# Patient Record
Sex: FEMALE | Race: WHITE | NOT HISPANIC OR LATINO
[De-identification: names, ages, dates, MRNs, and addresses within clinical notes are randomized per-mention and may not be internally consistent; named-entity substitution may affect disease eponyms.]

---

## 2017-01-06 ENCOUNTER — APPOINTMENT (OUTPATIENT)
Dept: PULMONOLOGY | Facility: CLINIC | Age: 72
End: 2017-01-06

## 2017-01-06 VITALS — SYSTOLIC BLOOD PRESSURE: 122 MMHG | DIASTOLIC BLOOD PRESSURE: 80 MMHG

## 2017-01-06 VITALS — WEIGHT: 158 LBS | BODY MASS INDEX: 24.02 KG/M2

## 2017-03-27 ENCOUNTER — OUTPATIENT (OUTPATIENT)
Dept: OUTPATIENT SERVICES | Facility: HOSPITAL | Age: 72
LOS: 1 days | End: 2017-03-27
Payer: MEDICARE

## 2017-03-27 VITALS
HEIGHT: 68.5 IN | SYSTOLIC BLOOD PRESSURE: 174 MMHG | TEMPERATURE: 98 F | RESPIRATION RATE: 18 BRPM | OXYGEN SATURATION: 97 % | DIASTOLIC BLOOD PRESSURE: 72 MMHG

## 2017-03-27 VITALS
HEIGHT: 68.5 IN | SYSTOLIC BLOOD PRESSURE: 174 MMHG | HEART RATE: 59 BPM | TEMPERATURE: 98 F | RESPIRATION RATE: 18 BRPM | WEIGHT: 156.09 LBS | OXYGEN SATURATION: 97 % | DIASTOLIC BLOOD PRESSURE: 72 MMHG

## 2017-03-27 DIAGNOSIS — Z90.49 ACQUIRED ABSENCE OF OTHER SPECIFIED PARTS OF DIGESTIVE TRACT: Chronic | ICD-10-CM

## 2017-03-27 DIAGNOSIS — E89.0 POSTPROCEDURAL HYPOTHYROIDISM: Chronic | ICD-10-CM

## 2017-03-27 DIAGNOSIS — Z01.810 ENCOUNTER FOR PREPROCEDURAL CARDIOVASCULAR EXAMINATION: ICD-10-CM

## 2017-03-27 DIAGNOSIS — I77.0 ARTERIOVENOUS FISTULA, ACQUIRED: Chronic | ICD-10-CM

## 2017-03-27 LAB
ANION GAP SERPL CALC-SCNC: 16 MMOL/L — SIGNIFICANT CHANGE UP (ref 5–17)
APTT BLD: 31.9 SEC — SIGNIFICANT CHANGE UP (ref 27.5–37.4)
BUN SERPL-MCNC: 59 MG/DL — HIGH (ref 8–20)
CALCIUM SERPL-MCNC: 7.9 MG/DL — LOW (ref 8.6–10.2)
CHLORIDE SERPL-SCNC: 97 MMOL/L — LOW (ref 98–107)
CO2 SERPL-SCNC: 25 MMOL/L — SIGNIFICANT CHANGE UP (ref 22–29)
CREAT SERPL-MCNC: 7.41 MG/DL — HIGH (ref 0.5–1.3)
GLUCOSE SERPL-MCNC: 93 MG/DL — SIGNIFICANT CHANGE UP (ref 70–115)
HCT VFR BLD CALC: 43.1 % — SIGNIFICANT CHANGE UP (ref 37–47)
HGB BLD-MCNC: 13.2 G/DL — SIGNIFICANT CHANGE UP (ref 12–16)
INR BLD: 1.04 RATIO — SIGNIFICANT CHANGE UP (ref 0.88–1.16)
MCHC RBC-ENTMCNC: 28.9 PG — SIGNIFICANT CHANGE UP (ref 27–31)
MCHC RBC-ENTMCNC: 30.6 G/DL — LOW (ref 32–36)
MCV RBC AUTO: 94.5 FL — SIGNIFICANT CHANGE UP (ref 81–99)
PLATELET # BLD AUTO: 271 K/UL — SIGNIFICANT CHANGE UP (ref 150–400)
POTASSIUM SERPL-MCNC: 5 MMOL/L — SIGNIFICANT CHANGE UP (ref 3.5–5.3)
POTASSIUM SERPL-SCNC: 5 MMOL/L — SIGNIFICANT CHANGE UP (ref 3.5–5.3)
PROTHROM AB SERPL-ACNC: 11.5 SEC — SIGNIFICANT CHANGE UP (ref 9.8–12.7)
RBC # BLD: 4.56 M/UL — SIGNIFICANT CHANGE UP (ref 4.4–5.2)
RBC # FLD: 17.2 % — HIGH (ref 11–15.6)
SODIUM SERPL-SCNC: 138 MMOL/L — SIGNIFICANT CHANGE UP (ref 135–145)
WBC # BLD: 4.9 K/UL — SIGNIFICANT CHANGE UP (ref 4.8–10.8)
WBC # FLD AUTO: 4.9 K/UL — SIGNIFICANT CHANGE UP (ref 4.8–10.8)

## 2017-03-27 PROCEDURE — 93010 ELECTROCARDIOGRAM REPORT: CPT

## 2017-03-27 RX ORDER — DIPHENHYDRAMINE HCL 50 MG
25 CAPSULE ORAL ONCE
Qty: 0 | Refills: 0 | Status: DISCONTINUED | OUTPATIENT
Start: 2017-03-30 | End: 2017-04-11

## 2017-03-27 NOTE — ASU PATIENT PROFILE, ADULT - PMH
Carotid artery disease  with bilat stenting  Coronary artery disease  s/p PTCA  CRF (chronic renal failure)    Diabetes    ESRD (end stage renal disease) on dialysis  dr cee   has av fistula  Hypertension    Thyroid cancer

## 2017-03-27 NOTE — H&P PST ADULT - HISTORY OF PRESENT ILLNESS
70 yo female with h/o CAD with stent   Recent c/o SOB at rest 72 yo female with h/o CAD with stent to RCA and LAD 7 years ago, carotid stents, HTN, HLD .  Patient was recently at [facility] 1/13/2017-1/22/2017 for GI bleed.  Patient had endoscopy and received 2 units of packed res blood cells. Recent c/o SOB at rest and chronic cough for several months. PET scan shows suspicious lung lesions bilaterally the patient is being scheduled for VATS procedure.  Patient had recent carotid study and stress test. Carotid study showed plaquing bilaterally worsening in LICA. 72 yo female with h/o CAD with stent to RCA and LAD 7 years ago, bilateral carotid stents, HTN, HLD, ESRD on dialysis.  Patient was recently at [facility] 1/13/2017-1/22/2017 for GI bleed.  Patient had endoscopy and received 2 units of packed res blood cells. Recent c/o SOB at rest and chronic cough for several months. PET scan shows suspicious lung lesions bilaterally the patient is being scheduled for VATS procedure.  Patient had recent carotid study and stress test. Carotid study showed plaquing bilaterally worsening in LICA (60-70%).  Stress test mild, mid-apical inferior wall ischemia.  EF 47%.  Patient developed SOB during procedure, symptoms resolved with aminophyline and procedure was stopped.   Patient presents for PST for cardiac cath to r/o CAD.      Dialysis [place] Tuesday, Thursday and Saturday with Dr Bass.

## 2017-03-27 NOTE — H&P PST ADULT - FAMILY HISTORY
Mother  Still living? No  Family history of diabetes mellitus, Age at diagnosis: Age Unknown     Father  Still living? No  Family history of emphysema, Age at diagnosis: Age Unknown     Sibling  Still living? No  Family history of emphysema, Age at diagnosis: Age Unknown

## 2017-03-27 NOTE — H&P PST ADULT - PSH
AV fistula  left arm  S/P colon resection    S/P thyroidectomy  had radiation treatments and surgery for thyroid cancer

## 2017-03-27 NOTE — H&P PST ADULT - PMH
Carotid artery disease  with bilat stenting  Coronary artery disease  s/p PTCA  CRF (chronic renal failure)    Diabetes    Hypertension    Thyroid cancer

## 2017-03-28 DIAGNOSIS — Z01.810 ENCOUNTER FOR PREPROCEDURAL CARDIOVASCULAR EXAMINATION: ICD-10-CM

## 2017-03-28 DIAGNOSIS — I25.10 ATHEROSCLEROTIC HEART DISEASE OF NATIVE CORONARY ARTERY WITHOUT ANGINA PECTORIS: ICD-10-CM

## 2017-03-30 ENCOUNTER — INPATIENT (INPATIENT)
Facility: HOSPITAL | Age: 72
LOS: 0 days | Discharge: HOME | DRG: 248 | End: 2017-03-31
Attending: INTERNAL MEDICINE | Admitting: INTERNAL MEDICINE
Payer: COMMERCIAL

## 2017-03-30 VITALS
SYSTOLIC BLOOD PRESSURE: 222 MMHG | DIASTOLIC BLOOD PRESSURE: 102 MMHG | TEMPERATURE: 98 F | OXYGEN SATURATION: 99 % | RESPIRATION RATE: 16 BRPM | HEART RATE: 64 BPM

## 2017-03-30 DIAGNOSIS — R94.39 ABNORMAL RESULT OF OTHER CARDIOVASCULAR FUNCTION STUDY: ICD-10-CM

## 2017-03-30 DIAGNOSIS — Z90.49 ACQUIRED ABSENCE OF OTHER SPECIFIED PARTS OF DIGESTIVE TRACT: Chronic | ICD-10-CM

## 2017-03-30 DIAGNOSIS — I77.0 ARTERIOVENOUS FISTULA, ACQUIRED: Chronic | ICD-10-CM

## 2017-03-30 DIAGNOSIS — E89.0 POSTPROCEDURAL HYPOTHYROIDISM: Chronic | ICD-10-CM

## 2017-03-30 PROCEDURE — 93458 L HRT ARTERY/VENTRICLE ANGIO: CPT | Mod: 26,XU

## 2017-03-30 PROCEDURE — 93010 ELECTROCARDIOGRAM REPORT: CPT

## 2017-03-30 PROCEDURE — 92928 PRQ TCAT PLMT NTRAC ST 1 LES: CPT | Mod: RC

## 2017-03-30 RX ORDER — LEVOTHYROXINE SODIUM 125 MCG
200 TABLET ORAL DAILY
Qty: 0 | Refills: 0 | Status: DISCONTINUED | OUTPATIENT
Start: 2017-03-30 | End: 2017-03-31

## 2017-03-30 RX ORDER — FEBUXOSTAT 40 MG/1
40 TABLET ORAL DAILY
Qty: 0 | Refills: 0 | Status: DISCONTINUED | OUTPATIENT
Start: 2017-03-30 | End: 2017-03-31

## 2017-03-30 RX ORDER — HYDRALAZINE HCL 50 MG
10 TABLET ORAL ONCE
Qty: 0 | Refills: 0 | Status: COMPLETED | OUTPATIENT
Start: 2017-03-30 | End: 2017-03-30

## 2017-03-30 RX ORDER — ALBUTEROL 90 UG/1
2 AEROSOL, METERED ORAL EVERY 6 HOURS
Qty: 0 | Refills: 0 | Status: DISCONTINUED | OUTPATIENT
Start: 2017-03-30 | End: 2017-03-31

## 2017-03-30 RX ORDER — SODIUM CHLORIDE 9 MG/ML
1000 INJECTION, SOLUTION INTRAVENOUS
Qty: 0 | Refills: 0 | Status: DISCONTINUED | OUTPATIENT
Start: 2017-03-30 | End: 2017-03-31

## 2017-03-30 RX ORDER — DEXTROSE 50 % IN WATER 50 %
25 SYRINGE (ML) INTRAVENOUS ONCE
Qty: 0 | Refills: 0 | Status: DISCONTINUED | OUTPATIENT
Start: 2017-03-30 | End: 2017-03-31

## 2017-03-30 RX ORDER — SEVELAMER CARBONATE 2400 MG/1
800 POWDER, FOR SUSPENSION ORAL
Qty: 0 | Refills: 0 | Status: DISCONTINUED | OUTPATIENT
Start: 2017-03-30 | End: 2017-03-31

## 2017-03-30 RX ORDER — CLOPIDOGREL BISULFATE 75 MG/1
75 TABLET, FILM COATED ORAL DAILY
Qty: 0 | Refills: 0 | Status: DISCONTINUED | OUTPATIENT
Start: 2017-03-30 | End: 2017-03-31

## 2017-03-30 RX ORDER — MESALAMINE 400 MG
1200 TABLET, DELAYED RELEASE (ENTERIC COATED) ORAL THREE TIMES A DAY
Qty: 0 | Refills: 0 | Status: DISCONTINUED | OUTPATIENT
Start: 2017-03-30 | End: 2017-03-31

## 2017-03-30 RX ORDER — ACETAMINOPHEN 500 MG
650 TABLET ORAL EVERY 6 HOURS
Qty: 0 | Refills: 0 | Status: DISCONTINUED | OUTPATIENT
Start: 2017-03-30 | End: 2017-03-31

## 2017-03-30 RX ORDER — GLUCAGON INJECTION, SOLUTION 0.5 MG/.1ML
1 INJECTION, SOLUTION SUBCUTANEOUS ONCE
Qty: 0 | Refills: 0 | Status: DISCONTINUED | OUTPATIENT
Start: 2017-03-30 | End: 2017-03-31

## 2017-03-30 RX ORDER — NEBIVOLOL HYDROCHLORIDE 5 MG/1
20 TABLET ORAL DAILY
Qty: 0 | Refills: 0 | Status: DISCONTINUED | OUTPATIENT
Start: 2017-03-30 | End: 2017-03-31

## 2017-03-30 RX ORDER — FAMOTIDINE 10 MG/ML
20 INJECTION INTRAVENOUS ONCE
Qty: 0 | Refills: 0 | Status: COMPLETED | OUTPATIENT
Start: 2017-03-30 | End: 2017-03-30

## 2017-03-30 RX ORDER — DEXTROSE 50 % IN WATER 50 %
1 SYRINGE (ML) INTRAVENOUS ONCE
Qty: 0 | Refills: 0 | Status: DISCONTINUED | OUTPATIENT
Start: 2017-03-30 | End: 2017-03-31

## 2017-03-30 RX ORDER — HYDRALAZINE HCL 50 MG
10 TABLET ORAL THREE TIMES A DAY
Qty: 0 | Refills: 0 | Status: DISCONTINUED | OUTPATIENT
Start: 2017-03-30 | End: 2017-03-31

## 2017-03-30 RX ORDER — DEXTROSE 50 % IN WATER 50 %
12.5 SYRINGE (ML) INTRAVENOUS ONCE
Qty: 0 | Refills: 0 | Status: DISCONTINUED | OUTPATIENT
Start: 2017-03-30 | End: 2017-03-31

## 2017-03-30 RX ORDER — INSULIN GLARGINE 100 [IU]/ML
5 INJECTION, SOLUTION SUBCUTANEOUS AT BEDTIME
Qty: 0 | Refills: 0 | Status: DISCONTINUED | OUTPATIENT
Start: 2017-03-30 | End: 2017-03-31

## 2017-03-30 RX ORDER — HYDRALAZINE HCL 50 MG
10 TABLET ORAL ONCE
Qty: 0 | Refills: 0 | Status: DISCONTINUED | OUTPATIENT
Start: 2017-03-30 | End: 2017-03-30

## 2017-03-30 RX ORDER — FOLIC ACID 0.8 MG
1 TABLET ORAL DAILY
Qty: 0 | Refills: 0 | Status: DISCONTINUED | OUTPATIENT
Start: 2017-03-30 | End: 2017-03-31

## 2017-03-30 RX ORDER — HYDRALAZINE HCL 50 MG
10 TABLET ORAL DAILY
Qty: 0 | Refills: 0 | Status: DISCONTINUED | OUTPATIENT
Start: 2017-03-30 | End: 2017-03-31

## 2017-03-30 RX ORDER — INSULIN GLARGINE 100 [IU]/ML
5 INJECTION, SOLUTION SUBCUTANEOUS EVERY MORNING
Qty: 0 | Refills: 0 | Status: DISCONTINUED | OUTPATIENT
Start: 2017-03-31 | End: 2017-03-31

## 2017-03-30 RX ORDER — NIFEDIPINE 30 MG
90 TABLET, EXTENDED RELEASE 24 HR ORAL EVERY 12 HOURS
Qty: 0 | Refills: 0 | Status: DISCONTINUED | OUTPATIENT
Start: 2017-03-30 | End: 2017-03-31

## 2017-03-30 RX ORDER — SODIUM CHLORIDE 9 MG/ML
3 INJECTION INTRAMUSCULAR; INTRAVENOUS; SUBCUTANEOUS EVERY 8 HOURS
Qty: 0 | Refills: 0 | Status: DISCONTINUED | OUTPATIENT
Start: 2017-03-30 | End: 2017-03-31

## 2017-03-30 RX ORDER — LOSARTAN POTASSIUM 100 MG/1
100 TABLET, FILM COATED ORAL DAILY
Qty: 0 | Refills: 0 | Status: DISCONTINUED | OUTPATIENT
Start: 2017-03-30 | End: 2017-03-31

## 2017-03-30 RX ORDER — COLCHICINE 0.6 MG
0.6 TABLET ORAL DAILY
Qty: 0 | Refills: 0 | Status: DISCONTINUED | OUTPATIENT
Start: 2017-03-30 | End: 2017-03-31

## 2017-03-30 RX ORDER — INSULIN LISPRO 100/ML
VIAL (ML) SUBCUTANEOUS
Qty: 0 | Refills: 0 | Status: DISCONTINUED | OUTPATIENT
Start: 2017-03-30 | End: 2017-03-31

## 2017-03-30 RX ORDER — PANTOPRAZOLE SODIUM 20 MG/1
40 TABLET, DELAYED RELEASE ORAL
Qty: 0 | Refills: 0 | Status: DISCONTINUED | OUTPATIENT
Start: 2017-03-30 | End: 2017-03-31

## 2017-03-30 RX ORDER — LABETALOL HCL 100 MG
10 TABLET ORAL ONCE
Qty: 0 | Refills: 0 | Status: DISCONTINUED | OUTPATIENT
Start: 2017-03-30 | End: 2017-03-30

## 2017-03-30 RX ORDER — CALCIUM ACETATE 667 MG
667 TABLET ORAL
Qty: 0 | Refills: 0 | Status: DISCONTINUED | OUTPATIENT
Start: 2017-03-30 | End: 2017-03-31

## 2017-03-30 RX ORDER — ASPIRIN/CALCIUM CARB/MAGNESIUM 324 MG
81 TABLET ORAL DAILY
Qty: 0 | Refills: 0 | Status: DISCONTINUED | OUTPATIENT
Start: 2017-03-30 | End: 2017-03-31

## 2017-03-30 RX ORDER — DIPHENHYDRAMINE HCL 50 MG
50 CAPSULE ORAL ONCE
Qty: 0 | Refills: 0 | Status: COMPLETED | OUTPATIENT
Start: 2017-03-30 | End: 2017-03-30

## 2017-03-30 RX ADMIN — FEBUXOSTAT 40 MILLIGRAM(S): 40 TABLET ORAL at 22:57

## 2017-03-30 RX ADMIN — Medication 650 MILLIGRAM(S): at 12:30

## 2017-03-30 RX ADMIN — Medication 10 MILLIGRAM(S): at 23:19

## 2017-03-30 RX ADMIN — Medication 10 MILLIGRAM(S): at 12:14

## 2017-03-30 RX ADMIN — Medication 0.6 MILLIGRAM(S): at 22:57

## 2017-03-30 RX ADMIN — INSULIN GLARGINE 5 UNIT(S): 100 INJECTION, SOLUTION SUBCUTANEOUS at 23:19

## 2017-03-30 RX ADMIN — Medication 10 MILLIGRAM(S): at 08:07

## 2017-03-30 RX ADMIN — SEVELAMER CARBONATE 800 MILLIGRAM(S): 2400 POWDER, FOR SUSPENSION ORAL at 15:20

## 2017-03-30 RX ADMIN — Medication 650 MILLIGRAM(S): at 11:10

## 2017-03-30 RX ADMIN — Medication 1 MILLIGRAM(S): at 15:19

## 2017-03-30 RX ADMIN — SODIUM CHLORIDE 3 MILLILITER(S): 9 INJECTION INTRAMUSCULAR; INTRAVENOUS; SUBCUTANEOUS at 22:12

## 2017-03-30 RX ADMIN — FAMOTIDINE 20 MILLIGRAM(S): 10 INJECTION INTRAVENOUS at 08:21

## 2017-03-30 RX ADMIN — SODIUM CHLORIDE 3 MILLILITER(S): 9 INJECTION INTRAMUSCULAR; INTRAVENOUS; SUBCUTANEOUS at 14:32

## 2017-03-30 RX ADMIN — Medication 10 MILLIGRAM(S): at 15:37

## 2017-03-30 RX ADMIN — Medication 50 MILLIGRAM(S): at 08:05

## 2017-03-30 RX ADMIN — PANTOPRAZOLE SODIUM 40 MILLIGRAM(S): 20 TABLET, DELAYED RELEASE ORAL at 15:20

## 2017-03-30 NOTE — PROGRESS NOTE ADULT - SUBJECTIVE AND OBJECTIVE BOX
Nurse Practitioner Progress note:   s/p [facility] with successful PCI and BMSx2 to RCA      Patient feels well.  Denies chest pain, shortness of breath, dizziness or palpitations at this time    Right radial hemoband in place.  Procedure site CDI, no bleeding, no hematoma, able to move all digits with capillary refill < 3 seconds, fingers warm      T(C): 36.6, Max: 36.6 (03-30 @ 07:56)  HR: 66 (64 - 66)  BP: 166/68 (166/68 - 222/102)  RR: 15 (15 - 16)  SpO2: 98% (98% - 99%)  Wt(kg): --    MEDICATIONS  (STANDING):  sodium chloride 0.9% lock flush 3milliLiter(s) IV Push every 8 hours  aspirin enteric coated 81milliGRAM(s) Oral daily  losartan 100milliGRAM(s) Oral daily  nebivolol 20milliGRAM(s) Oral daily  febuxostat 40milliGRAM(s) Oral daily  colchicine 0.6milliGRAM(s) Oral daily  clopidogrel Tablet 75milliGRAM(s) Oral daily  NIFEdipine XL 90milliGRAM(s) Oral every 12 hours  calcium acetate 667milliGRAM(s) Oral three times a day with meals  sevelamer hydrochloride 800milliGRAM(s) Oral three times a day with meals  pantoprazole    Tablet 40milliGRAM(s) Oral before breakfast  levothyroxine 200MICROGram(s) Oral daily  hydrALAZINE 10milliGRAM(s) Oral three times a day  folic acid 1milliGRAM(s) Oral daily  mesalamine DR Capsule 1200milliGRAM(s) Oral three times a day  insulin glargine Injectable (LANTUS) 5Unit(s) SubCutaneous at bedtime  insulin lispro (HumaLOG) corrective regimen sliding scale  SubCutaneous Before meals and at bedtime  dextrose 5%. 1000milliLiter(s) IV Continuous <Continuous>  dextrose 50% Injectable 12.5Gram(s) IV Push once  dextrose 50% Injectable 25Gram(s) IV Push once  dextrose 50% Injectable 25Gram(s) IV Push once    MEDICATIONS  (PRN):  ALBUTerol    90 MICROgram(s) HFA Inhaler 2Puff(s) Inhalation every 6 hours PRN Shortness of Breath and/or Wheezing  dextrose Gel 1Dose(s) Oral once PRN Blood Glucose LESS THAN 70 milliGRAM(s)/deciliter  glucagon  Injectable 1milliGRAM(s) IntraMuscular once PRN Glucose LESS THAN 70 milligrams/deciliter        71 year old female with h/o CAD with stent to RCA and LAD 7 years ago, bilateral carotid stents, HTN, HLD, ESRD on dialysis (TU/TH/SU) with MD Bass.  Patient was recently at [facility] 1/13/2017-1/22/2017 for GI bleed.  Patient had endoscopy and received 2 units of packed res blood cells. Recent c/o SOB at rest and chronic cough for several months. PET scan shows suspicious lung lesions bilaterally the patient is being scheduled for VATS procedure.  Patient had recent carotid study and stress test. Carotid study showed plaquing bilaterally worsening in LICA (60-70%).  Stress test mild, mid-apical inferior wall ischemia.  EF 47%.  Patient developed SOB during procedure, symptoms resolved with aminophyline and procedure was stopped.   now s/p [facility] and succesful PCI with BMSx2 to RCA    ASSESSMENT/PLAN:    Coronary artery disease  -Admission criteria met (Creatinine > 1.5/HD, poorly controlled HTN)  -VS, labs, diet, activity as per PCI orders  -Aspirin 81 mg PO daily  -Plavix 75mg PO daily for at least 1 month  -insulin SS  -continue BB, ARB, CCB, statin, PPI, hydralazine  -start atorvastatin 10mg and watch for symptoms of leg pain (patient had reaction 10 years ago)  -MD Kali Nettles's group notified   -Plan of care discussed with patient, family and MD Hutchinson  -likely d/c in AM if patient remains stable overnight  -NP to see in AM to evaluate labs, EKG and procedure site check  -Follow-up with attending MD Singh  within 1 week  -Discussed therapeutic lifestyle changes to reduce risk factors such as following a cardiac diet, weight loss, maintaining a healthy weight, exercise, smoking cessation, medication compliance, and regular follow-up  with MD to know your numbers (BP, cholesterol, weight, and glucose)

## 2017-03-30 NOTE — CONSULT NOTE ADULT - SUBJECTIVE AND OBJECTIVE BOX
Patient is a 71y old  Female who presents with a chief complaint of abnormal cardiac stress test.    HPI: The pt is a 70 yo W female PMH + ESRD (TTS) who is currently post cardiac cath and stent placements.  Pt tolerated well and clinically stable now.  The patient is due for dialysis today. Currently no fever/chills, cp, sob, n/v/d, abd pain, dysuria      PAST MEDICAL & SURGICAL HISTORY:  ESRD (end stage renal disease) on dialysis: dr cee   has av fistula  Carotid artery disease: with bilat stenting  Coronary artery disease: s/p PTCA  Thyroid cancer  Diabetes  Hypertension  CRF (chronic renal failure)  AV fistula: left arm  S/P thyroidectomy: had radiation treatments and surgery for thyroid cancer  S/P colon resection      FAMILY HISTORY:  Family history of emphysema (Father, Sibling)  Family history of diabetes mellitus (Mother)      Social History:  NO current tobacco; no hx etoh nor drug abuse    MEDICATIONS  (STANDING):  sodium chloride 0.9% lock flush 3milliLiter(s) IV Push every 8 hours  aspirin enteric coated 81milliGRAM(s) Oral daily  losartan 100milliGRAM(s) Oral daily  nebivolol 20milliGRAM(s) Oral daily  febuxostat 40milliGRAM(s) Oral daily  colchicine 0.6milliGRAM(s) Oral daily  clopidogrel Tablet 75milliGRAM(s) Oral daily  NIFEdipine XL 90milliGRAM(s) Oral every 12 hours  calcium acetate 667milliGRAM(s) Oral three times a day with meals  sevelamer hydrochloride 800milliGRAM(s) Oral three times a day with meals  pantoprazole    Tablet 40milliGRAM(s) Oral before breakfast  levothyroxine 200MICROGram(s) Oral daily  hydrALAZINE 10milliGRAM(s) Oral three times a day  folic acid 1milliGRAM(s) Oral daily  mesalamine DR Capsule 1200milliGRAM(s) Oral three times a day  insulin glargine Injectable (LANTUS) 5Unit(s) SubCutaneous at bedtime  insulin lispro (HumaLOG) corrective regimen sliding scale  SubCutaneous Before meals and at bedtime  dextrose 5%. 1000milliLiter(s) IV Continuous <Continuous>  dextrose 50% Injectable 12.5Gram(s) IV Push once  dextrose 50% Injectable 25Gram(s) IV Push once  dextrose 50% Injectable 25Gram(s) IV Push once    MEDICATIONS  (PRN):  ALBUTerol    90 MICROgram(s) HFA Inhaler 2Puff(s) Inhalation every 6 hours PRN Shortness of Breath and/or Wheezing  dextrose Gel 1Dose(s) Oral once PRN Blood Glucose LESS THAN 70 milliGRAM(s)/deciliter  glucagon  Injectable 1milliGRAM(s) IntraMuscular once PRN Glucose LESS THAN 70 milligrams/deciliter  acetaminophen   Tablet. 650milliGRAM(s) Oral every 6 hours PRN Moderate Pain (4 - 6)      Allergies    Azulfidine (Rash)  Betadine (Rash)  codeine (Rash)  iodinated radiocontrast agents (Rash)  shellfish (Hives)        Vital Signs Last 24 Hrs  T(C): 36.6, Max: 36.6 (03-30 @ 07:56)  T(F): 97.8, Max: 97.8 (03-30 @ 07:56)  HR: 67 (62 - 67)  BP: 178/72 (159/67 - 222/102)  BP(mean): --  RR: 15 (14 - 16)  SpO2: 97% (96% - 99%)    PHYSICAL EXAM:    GENERAL: NAD, well-groomed, well-developed  HEAD:  Atraumatic, Normocephalic  EYES: EOMI, PERRLA, conjunctiva and sclera clears  NECK: Supple, No JVD, Normal thyroid  NERVOUS SYSTEM:  Alert & Oriented X3, intact and symmetric  CHEST/LUNG: Clear bilaterally  HEART: No rub  ABDOMEN: Soft, Nontender, Nondistended; Bowel sounds present  EXTREMITIES:  2+ Peripheral Pulses, No clubbing, cyanosis, or edema        LABS:    Potassium, Serum: 5.0 mmol/L (03.27.17 @ 08:50)    Hemoglobin: 13.2 g/dL (03.27.17 @ 08:50)                  RADIOLOGY & ADDITIONAL TESTS:

## 2017-03-31 ENCOUNTER — TRANSCRIPTION ENCOUNTER (OUTPATIENT)
Age: 72
End: 2017-03-31

## 2017-03-31 VITALS — SYSTOLIC BLOOD PRESSURE: 132 MMHG | DIASTOLIC BLOOD PRESSURE: 78 MMHG | HEART RATE: 64 BPM | TEMPERATURE: 98 F

## 2017-03-31 LAB
ANION GAP SERPL CALC-SCNC: 15 MMOL/L — SIGNIFICANT CHANGE UP (ref 5–17)
BUN SERPL-MCNC: 38 MG/DL — HIGH (ref 8–20)
CALCIUM SERPL-MCNC: 7.8 MG/DL — LOW (ref 8.6–10.2)
CHLORIDE SERPL-SCNC: 97 MMOL/L — LOW (ref 98–107)
CHOLEST SERPL-MCNC: 108 MG/DL — LOW (ref 110–199)
CO2 SERPL-SCNC: 28 MMOL/L — SIGNIFICANT CHANGE UP (ref 22–29)
CREAT SERPL-MCNC: 5.81 MG/DL — HIGH (ref 0.5–1.3)
GLUCOSE SERPL-MCNC: 73 MG/DL — SIGNIFICANT CHANGE UP (ref 70–115)
HBA1C BLD-MCNC: 4.3 % — SIGNIFICANT CHANGE UP (ref 4–5.6)
HCT VFR BLD CALC: 38.6 % — SIGNIFICANT CHANGE UP (ref 37–47)
HDLC SERPL-MCNC: 45 MG/DL — LOW
HGB BLD-MCNC: 12.1 G/DL — SIGNIFICANT CHANGE UP (ref 12–16)
LIPID PNL WITH DIRECT LDL SERPL: 47 MG/DL — SIGNIFICANT CHANGE UP
MCHC RBC-ENTMCNC: 28.7 PG — SIGNIFICANT CHANGE UP (ref 27–31)
MCHC RBC-ENTMCNC: 31.3 G/DL — LOW (ref 32–36)
MCV RBC AUTO: 91.7 FL — SIGNIFICANT CHANGE UP (ref 81–99)
PLATELET # BLD AUTO: 250 K/UL — SIGNIFICANT CHANGE UP (ref 150–400)
POTASSIUM SERPL-MCNC: 4.5 MMOL/L — SIGNIFICANT CHANGE UP (ref 3.5–5.3)
POTASSIUM SERPL-SCNC: 4.5 MMOL/L — SIGNIFICANT CHANGE UP (ref 3.5–5.3)
RBC # BLD: 4.21 M/UL — LOW (ref 4.4–5.2)
RBC # FLD: 17.5 % — HIGH (ref 11–15.6)
SODIUM SERPL-SCNC: 140 MMOL/L — SIGNIFICANT CHANGE UP (ref 135–145)
TOTAL CHOLESTEROL/HDL RATIO MEASUREMENT: 2 RATIO — LOW (ref 3.3–7.1)
TRIGL SERPL-MCNC: 80 MG/DL — SIGNIFICANT CHANGE UP (ref 10–200)
WBC # BLD: 5.6 K/UL — SIGNIFICANT CHANGE UP (ref 4.8–10.8)
WBC # FLD AUTO: 5.6 K/UL — SIGNIFICANT CHANGE UP (ref 4.8–10.8)

## 2017-03-31 PROCEDURE — C1887: CPT

## 2017-03-31 PROCEDURE — 85730 THROMBOPLASTIN TIME PARTIAL: CPT

## 2017-03-31 PROCEDURE — 80061 LIPID PANEL: CPT

## 2017-03-31 PROCEDURE — C1894: CPT

## 2017-03-31 PROCEDURE — 92928 PRQ TCAT PLMT NTRAC ST 1 LES: CPT | Mod: RC

## 2017-03-31 PROCEDURE — C1876: CPT

## 2017-03-31 PROCEDURE — G0463: CPT

## 2017-03-31 PROCEDURE — 85610 PROTHROMBIN TIME: CPT

## 2017-03-31 PROCEDURE — 99261: CPT

## 2017-03-31 PROCEDURE — 93458 L HRT ARTERY/VENTRICLE ANGIO: CPT

## 2017-03-31 PROCEDURE — 83036 HEMOGLOBIN GLYCOSYLATED A1C: CPT

## 2017-03-31 PROCEDURE — 93005 ELECTROCARDIOGRAM TRACING: CPT

## 2017-03-31 PROCEDURE — C1769: CPT

## 2017-03-31 PROCEDURE — 80048 BASIC METABOLIC PNL TOTAL CA: CPT

## 2017-03-31 PROCEDURE — C1725: CPT

## 2017-03-31 PROCEDURE — 93010 ELECTROCARDIOGRAM REPORT: CPT

## 2017-03-31 PROCEDURE — 85027 COMPLETE CBC AUTOMATED: CPT

## 2017-03-31 RX ADMIN — CLOPIDOGREL BISULFATE 75 MILLIGRAM(S): 75 TABLET, FILM COATED ORAL at 11:05

## 2017-03-31 RX ADMIN — Medication 81 MILLIGRAM(S): at 11:05

## 2017-03-31 RX ADMIN — Medication 0.6 MILLIGRAM(S): at 11:05

## 2017-03-31 RX ADMIN — INSULIN GLARGINE 5 UNIT(S): 100 INJECTION, SOLUTION SUBCUTANEOUS at 08:29

## 2017-03-31 RX ADMIN — NEBIVOLOL HYDROCHLORIDE 20 MILLIGRAM(S): 5 TABLET ORAL at 06:09

## 2017-03-31 RX ADMIN — SEVELAMER CARBONATE 800 MILLIGRAM(S): 2400 POWDER, FOR SUSPENSION ORAL at 08:24

## 2017-03-31 RX ADMIN — Medication 10 MILLIGRAM(S): at 06:09

## 2017-03-31 RX ADMIN — LOSARTAN POTASSIUM 100 MILLIGRAM(S): 100 TABLET, FILM COATED ORAL at 08:22

## 2017-03-31 RX ADMIN — Medication 667 MILLIGRAM(S): at 08:21

## 2017-03-31 RX ADMIN — SODIUM CHLORIDE 3 MILLILITER(S): 9 INJECTION INTRAMUSCULAR; INTRAVENOUS; SUBCUTANEOUS at 06:04

## 2017-03-31 RX ADMIN — FEBUXOSTAT 40 MILLIGRAM(S): 40 TABLET ORAL at 11:06

## 2017-03-31 RX ADMIN — Medication 200 MICROGRAM(S): at 06:08

## 2017-03-31 RX ADMIN — PANTOPRAZOLE SODIUM 40 MILLIGRAM(S): 20 TABLET, DELAYED RELEASE ORAL at 06:09

## 2017-03-31 RX ADMIN — Medication 1 MILLIGRAM(S): at 11:06

## 2017-03-31 NOTE — DISCHARGE NOTE ADULT - PLAN OF CARE
return to pre hospital functional status. follow up with your primary MD within one week.  Return to the Emergency dept. for any chest pain or shortness of breath  Restricted use with no heavy lifting of affected arm for 48 hours.  No submerging the arm in water for 48 hours.  You may start showering today.  Call your doctor for any bleeding, swelling, loss of sensation in the hand or fingers, or fingers turning blue.  If heavy bleeding or large lumps form, hold pressure at the spot and come to the Emergency Room. Do not stop Aspirin or Plavix without speaking with cardiologist first follow up with Dr. Singh in one week  See your primary MD w/in one week.

## 2017-03-31 NOTE — DISCHARGE NOTE ADULT - MEDICATION SUMMARY - MEDICATIONS TO TAKE
I will START or STAY ON the medications listed below when I get home from the hospital:    Lialda 1.2 g oral delayed release tablet  -- 1 tab(s) by mouth 2 times a day  -- Indication: For gi    Aspirin Enteric Coated 81 mg oral delayed release tablet  -- 1 tab(s) by mouth once a day  -- Indication: For cad with stents    irbesartan 300 mg oral tablet  -- 1 tab(s) by mouth once a day  -- Indication: For cad    Lantus 100 units/mL subcutaneous solution  -- 5 unit(s) subcutaneous 2 times a day  -- Indication: For DM    colchicine 0.6 mg oral capsule  -- 1 cap(s) by mouth once a day  -- Indication: For gout    Uloric 40 mg oral tablet  -- 1 tab(s) by mouth once a day  -- Indication: For gout    clopidogrel 75 mg oral tablet  -- 1 tab(s) by mouth once a day  -- Indication: For Cad with stents    Bystolic 20 mg oral tablet  -- 1 tab(s) by mouth once a day  -- Indication: For Cad    ProAir HFA 90 mcg/inh inhalation aerosol  -- 2 puff(s) inhaled 4 times a day, As Needed  -- Indication: For asthma    NIFEdipine 90 mg oral tablet, extended release  -- 1 tab(s) by mouth every 12 hours  -- Indication: For HTN    calcium acetate 667 mg oral capsule  -- 1 cap(s) by mouth 3 times a day (with meals)  -- Indication: For HD    Renvela 0.8 g oral powder for reconstitution  --  by mouth 3 times a day  -- Indication: For HD    pantoprazole 40 mg oral delayed release tablet  -- 1 tab(s) by mouth every other day  -- Indication: For gerd    Synthroid 200 mcg (0.2 mg) oral tablet  -- 1 tab(s) by mouth once a day  -- Indication: For hypothyroid    hydrALAZINE 10 mg oral tablet  -- 1 tab(s) by mouth 3 times a day  -- Indication: For HTN    folic acid 1 mg oral tablet  -- 1 tab(s) by mouth once a day  -- Indication: For supplement.

## 2017-03-31 NOTE — DISCHARGE NOTE ADULT - CARE PROVIDERS DIRECT ADDRESSES
,DirectAddress_Unknown,[email or web address]

## 2017-03-31 NOTE — DISCHARGE NOTE ADULT - PATIENT PORTAL LINK FT
“You can access the FollowHealth Patient Portal, offered by [facility], by registering with the following website: [email or web address]”

## 2017-03-31 NOTE — DISCHARGE NOTE ADULT - CARE PROVIDER_API CALL
Antonio Singh (MD), Cardiovascular Disease  [address] [place] [address]  Phone: [phone]  Fax: [phone]

## 2017-03-31 NOTE — PROGRESS NOTE ADULT - SUBJECTIVE AND OBJECTIVE BOX
SUBJECTIVE: NP cardiology f/u note: SP [facility] 3/30/17 BMS X2 to RCA  denies complaints of chest pain/sob/dizziness/palps overnight    	  MEDICATIONS:  losartan 100milliGRAM(s) Oral daily  nebivolol 20milliGRAM(s) Oral daily  NIFEdipine XL 90milliGRAM(s) Oral every 12 hours  hydrALAZINE 10milliGRAM(s) Oral three times a day  hydrALAZINE Injectable 10milliGRAM(s) IV Push daily      ALBUTerol    90 MICROgram(s) HFA Inhaler 2Puff(s) Inhalation every 6 hours PRN    acetaminophen   Tablet. 650milliGRAM(s) Oral every 6 hours PRN    pantoprazole    Tablet 40milliGRAM(s) Oral before breakfast  mesalamine DR Capsule 1200milliGRAM(s) Oral three times a day    febuxostat 40milliGRAM(s) Oral daily  colchicine 0.6milliGRAM(s) Oral daily  levothyroxine 200MICROGram(s) Oral daily  insulin glargine Injectable (LANTUS) 5Unit(s) SubCutaneous every morning  insulin glargine Injectable (LANTUS) 5Unit(s) SubCutaneous at bedtime  insulin lispro (HumaLOG) corrective regimen sliding scale  SubCutaneous Before meals and at bedtime  dextrose Gel 1Dose(s) Oral once PRN  dextrose 50% Injectable 12.5Gram(s) IV Push once  dextrose 50% Injectable 25Gram(s) IV Push once  dextrose 50% Injectable 25Gram(s) IV Push once  glucagon  Injectable 1milliGRAM(s) IntraMuscular once PRN    aspirin enteric coated 81milliGRAM(s) Oral daily  clopidogrel Tablet 75milliGRAM(s) Oral daily  calcium acetate 667milliGRAM(s) Oral three times a day with meals  folic acid 1milliGRAM(s) Oral daily  dextrose 5%. 1000milliLiter(s) IV Continuous <Continuous>        PHYSICAL EXAM:    T(C): 36.7, Max: 37 (03-30 @ 20:35)  HR: 60 (60 - 69)  BP: 125/78 (125/78 - 197/87)  RR: 18 (15 - 18)  SpO2: 98% (96% - 100%)  Wt(kg): --    I&O's Summary    I & Os for current day (as of 31 Mar 2017 11:10)  =============================================  IN: 1100 ml / OUT: 2400 ml / NET: -1300 ml      Daily     Daily Weight in k.3 (31 Mar 2017 06:27)    Appearance: Normal	  HEENT:   Normal oral mucosa, 	  Lymphatic: No lymphadenopathy  Cardiovascular: Normal S1 S2, RRR 78  No JVD, No murmurs, No edema   Respiratory: Lungs clear to auscultation	  Psychiatry: A & O x 3, Mood & affect appropriate  Gastrointestinal:  Soft, Non-tender, + BS	  Skin: No rashes, No ecchymoses, No cyanosis  Neurologic: Non-focal  Extremities: Normal range of motion, No clubbing, cyanosis or edema Right radial  site clear slight ecchymosis. NV intact good cap refill . dressing removed.   Vascular: Peripheral pulses palpable 2+ bilaterally    TELEMETRY: 	 RSR 70's  no events overnight     ECG:   RSR 64/peaked T's  anteriorily / NSSTTW changes.. unchanged from previous. 	  RADIOLOGY:   DIAGNOSTIC TESTING:  [ ] Echocardiogram:  [X  ]  Catheterization: no official report . BMS X2 RCA   [ ] Stress Test:    OTHER: 	    LABS:	 	    CARDIAC MARKERS:                                  12.1   5.6   )-----------( 250      ( 31 Mar 2017 05:15 )             38.6     31 Mar 2017 05:15    140    |  97     |  38.0   ----------------------------<  73     4.5     |  28.0   |  5.81     Ca    7.8        31 Mar 2017 05:15      proBNP:   Lipid Profile:   HgA1c: Hemoglobin A1C, Whole Blood: 4.3 % ( @ 05:15)    TSH:     ASSESSMENT:  70 yo female with h/o CAD with stent to RCA and LAD 7 years ago, bilateral carotid stents, HTN, HLD, ESRD on dialysis.  Patient was recently at [facility] 2017-2017 for GI bleed.  Patient had endoscopy and received 2 units of packed res blood cells. Recent c/o SOB at rest and chronic cough for several months. PET scan shows suspicious lung lesions bilaterally the patient is being scheduled for VATS procedure.  Patient had recent carotid study and stress test. Carotid study showed plaquing bilaterally worsening in LICA (60-70%).  Stress test mild, mid-apical inferior wall ischemia.  EF 47%.  Patient developed SOB during procedure, symptoms resolved with aminophyline and procedure was stopped.   Patient presents for PST for cardiac cath to r/o CAD.    SP [facility] 3/30/17 with BMS X2 RCA  HX as above  Hemodynamically stable ovenight   for D/C home today out pt follow up discussed. / pt refuses statin / reaction in the past.   no changes to meds.   radial care discussed.   D/w Dr. Hutchinson

## 2017-03-31 NOTE — DISCHARGE NOTE ADULT - HOSPITAL COURSE
72 yo female with h/o CAD with stent to RCA and LAD 7 years ago, bilateral carotid stents, HTN, HLD, ESRD on dialysis.  Patient was recently at [facility] 1/13/2017-1/22/2017 for GI bleed.  Patient had endoscopy and received 2 units of packed res blood cells. Recent c/o SOB at rest and chronic cough for several months. PET scan shows suspicious lung lesions bilaterally the patient is being scheduled for VATS procedure.  Patient had recent carotid study and stress test. Carotid study showed plaquing bilaterally worsening in LICA (60-70%).  Stress test mild, mid-apical inferior wall ischemia.  EF 47%.  Patient developed SOB during procedure, symptoms resolved with aminophyline and procedure was stopped.   Patient presents for PST for cardiac cath to r/o CAD.    s/p [facility] with successful PCI and BMSx2 to RCA      Dialysis [place] Tuesday, Thursday and Saturday with Dr Bass.  72 yo female with h/o CAD with stent to RCA and LAD 7 years ago, carotid stents, HTN, HLD .  Patient was recently at [facility] 1/13/2017-1/22/2017 for GI bleed.  Patient had endoscopy and received 2 units of packed res blood cells. Recent c/o SOB at rest and chronic cough for several months. PET scan shows suspicious lung lesions bilaterally the patient is being scheduled for VATS procedure.  Patient had recent carotid study and stress test. Carotid study showed plaquing bilaterally worsening in LICA. 70 yo female with h/o CAD with stent to RCA and LAD 7 years ago, bilateral carotid stents, HTN, HLD, ESRD on dialysis.  Patient was recently at [facility] 1/13/2017-1/22/2017 for GI bleed.  Patient had endoscopy and received 2 units of packed res blood cells. Recent c/o SOB at rest and chronic cough for several months. PET scan shows suspicious lung lesions bilaterally the patient is being scheduled for VATS procedure.  Patient had recent carotid study and stress test. Carotid study showed plaquing bilaterally worsening in LICA (60-70%).  Stress test mild, mid-apical inferior wall ischemia.  EF 47%.  Patient developed SOB during procedure, symptoms resolved with aminophyline and procedure was stopped.   Patient presents for PST for cardiac cath to r/o CAD.    s/p [facility] with successful PCI and BMSx2 to RCA 3/30/17  - pt hemodynamically stable overnight labs and ekg stable.   -denies cp/sob  Right radial care discussed.   Pt to follow up with Dr. Singh and PMD in one week  Continue all meds asa, plavix/ BB/ ARB/ pt refuses statin secondary to reaction of severe leg pain   HD as patient schedule.  D/C home.        Dialysis [place] Tuesday, Thursday and Saturday with Dr Bass.  70 yo female with h/o CAD with stent to RCA and LAD 7 years ago, carotid stents, HTN, HLD .  Patient was recently at [facility] 1/13/2017-1/22/2017 for GI bleed.  Patient had endoscopy and received 2 units of packed res blood cells. Recent c/o SOB at rest and chronic cough for several months. PET scan shows suspicious lung lesions bilaterally the patient is being scheduled for VATS procedure.  Patient had recent carotid study and stress test. Carotid study showed plaquing bilaterally worsening in LICA.

## 2017-03-31 NOTE — DISCHARGE NOTE ADULT - CARE PLAN
Principal Discharge DX:	Coronary artery disease with unstable angina pectoris, unspecified vessel or lesion type, unspecified whether native or transplanted heart  Goal:	return to pre hospital functional status.  Instructions for follow-up, activity and diet:	follow up with your primary MD within one week.  Return to the Emergency dept. for any chest pain or shortness of breath  Restricted use with no heavy lifting of affected arm for 48 hours.  No submerging the arm in water for 48 hours.  You may start showering today.  Call your doctor for any bleeding, swelling, loss of sensation in the hand or fingers, or fingers turning blue.  If heavy bleeding or large lumps form, hold pressure at the spot and come to the Emergency Room.  Instructions for follow-up, activity and diet:	Do not stop Aspirin or Plavix without speaking with cardiologist first  Instructions for follow-up, activity and diet:	follow up with Dr. Singh in one week  See your primary MD w/in one week.

## 2017-04-14 ENCOUNTER — APPOINTMENT (OUTPATIENT)
Dept: PULMONOLOGY | Facility: CLINIC | Age: 72
End: 2017-04-14

## 2017-06-19 ENCOUNTER — RECORD ABSTRACTING (OUTPATIENT)
Age: 72
End: 2017-06-19

## 2017-06-19 DIAGNOSIS — M10.9 GOUT, UNSPECIFIED: ICD-10-CM

## 2017-06-26 ENCOUNTER — APPOINTMENT (OUTPATIENT)
Dept: THORACIC SURGERY | Facility: CLINIC | Age: 72
End: 2017-06-26

## 2017-06-26 VITALS
RESPIRATION RATE: 16 BRPM | DIASTOLIC BLOOD PRESSURE: 80 MMHG | HEIGHT: 68 IN | OXYGEN SATURATION: 100 % | SYSTOLIC BLOOD PRESSURE: 222 MMHG | BODY MASS INDEX: 23.95 KG/M2 | HEART RATE: 61 BPM | WEIGHT: 158 LBS

## 2017-08-02 ENCOUNTER — OUTPATIENT (OUTPATIENT)
Dept: OUTPATIENT SERVICES | Facility: HOSPITAL | Age: 72
LOS: 1 days | End: 2017-08-02
Payer: MEDICARE

## 2017-08-02 VITALS
WEIGHT: 163.14 LBS | SYSTOLIC BLOOD PRESSURE: 150 MMHG | RESPIRATION RATE: 16 BRPM | DIASTOLIC BLOOD PRESSURE: 80 MMHG | HEART RATE: 68 BPM | HEIGHT: 68 IN | TEMPERATURE: 98 F

## 2017-08-02 DIAGNOSIS — I77.0 ARTERIOVENOUS FISTULA, ACQUIRED: Chronic | ICD-10-CM

## 2017-08-02 DIAGNOSIS — Z01.818 ENCOUNTER FOR OTHER PREPROCEDURAL EXAMINATION: ICD-10-CM

## 2017-08-02 DIAGNOSIS — E89.0 POSTPROCEDURAL HYPOTHYROIDISM: Chronic | ICD-10-CM

## 2017-08-02 DIAGNOSIS — Z90.49 ACQUIRED ABSENCE OF OTHER SPECIFIED PARTS OF DIGESTIVE TRACT: Chronic | ICD-10-CM

## 2017-08-02 DIAGNOSIS — R91.8 OTHER NONSPECIFIC ABNORMAL FINDING OF LUNG FIELD: ICD-10-CM

## 2017-08-02 LAB
ANION GAP SERPL CALC-SCNC: 16 MMOL/L — SIGNIFICANT CHANGE UP (ref 5–17)
ANISOCYTOSIS BLD QL: SLIGHT — SIGNIFICANT CHANGE UP
APTT BLD: 29.6 SEC — SIGNIFICANT CHANGE UP (ref 27.5–37.4)
BUN SERPL-MCNC: 26 MG/DL — HIGH (ref 8–20)
CALCIUM SERPL-MCNC: 8.1 MG/DL — LOW (ref 8.6–10.2)
CHLORIDE SERPL-SCNC: 98 MMOL/L — SIGNIFICANT CHANGE UP (ref 98–107)
CO2 SERPL-SCNC: 31 MMOL/L — HIGH (ref 22–29)
CREAT SERPL-MCNC: 5.87 MG/DL — HIGH (ref 0.5–1.3)
EOSINOPHIL # BLD AUTO: 0.2 K/UL — SIGNIFICANT CHANGE UP (ref 0–0.5)
EOSINOPHIL NFR BLD AUTO: 4 % — SIGNIFICANT CHANGE UP (ref 0–5)
GLUCOSE SERPL-MCNC: 96 MG/DL — SIGNIFICANT CHANGE UP (ref 70–115)
HCT VFR BLD CALC: 32.2 % — LOW (ref 37–47)
HGB BLD-MCNC: 10.2 G/DL — LOW (ref 12–16)
HYPOCHROMIA BLD QL: SLIGHT — SIGNIFICANT CHANGE UP
INR BLD: 1 RATIO — SIGNIFICANT CHANGE UP (ref 0.88–1.16)
LYMPHOCYTES # BLD AUTO: 0.5 K/UL — LOW (ref 1–4.8)
LYMPHOCYTES # BLD AUTO: 12 % — LOW (ref 20–55)
MCHC RBC-ENTMCNC: 31.2 PG — HIGH (ref 27–31)
MCHC RBC-ENTMCNC: 31.7 G/DL — LOW (ref 32–36)
MCV RBC AUTO: 98.5 FL — SIGNIFICANT CHANGE UP (ref 81–99)
MONOCYTES # BLD AUTO: 0.5 K/UL — SIGNIFICANT CHANGE UP (ref 0–0.8)
MONOCYTES NFR BLD AUTO: 12 % — HIGH (ref 3–10)
NEUTROPHILS # BLD AUTO: 3 K/UL — SIGNIFICANT CHANGE UP (ref 1.8–8)
NEUTROPHILS NFR BLD AUTO: 70 % — SIGNIFICANT CHANGE UP (ref 37–73)
NEUTS BAND # BLD: 2 % — SIGNIFICANT CHANGE UP (ref 0–8)
PLAT MORPH BLD: NORMAL — SIGNIFICANT CHANGE UP
PLATELET # BLD AUTO: 257 K/UL — SIGNIFICANT CHANGE UP (ref 150–400)
POTASSIUM SERPL-MCNC: 4.5 MMOL/L — SIGNIFICANT CHANGE UP (ref 3.5–5.3)
POTASSIUM SERPL-SCNC: 4.5 MMOL/L — SIGNIFICANT CHANGE UP (ref 3.5–5.3)
PROTHROM AB SERPL-ACNC: 11 SEC — SIGNIFICANT CHANGE UP (ref 9.8–12.7)
RBC # BLD: 3.27 M/UL — LOW (ref 4.4–5.2)
RBC # FLD: 16.6 % — HIGH (ref 11–15.6)
RBC BLD AUTO: ABNORMAL
SODIUM SERPL-SCNC: 145 MMOL/L — SIGNIFICANT CHANGE UP (ref 135–145)
WBC # BLD: 4.3 K/UL — LOW (ref 4.8–10.8)
WBC # FLD AUTO: 4.3 K/UL — LOW (ref 4.8–10.8)

## 2017-08-02 PROCEDURE — 80048 BASIC METABOLIC PNL TOTAL CA: CPT

## 2017-08-02 PROCEDURE — G0463: CPT

## 2017-08-02 PROCEDURE — 36415 COLL VENOUS BLD VENIPUNCTURE: CPT

## 2017-08-02 PROCEDURE — 85610 PROTHROMBIN TIME: CPT

## 2017-08-02 PROCEDURE — 85730 THROMBOPLASTIN TIME PARTIAL: CPT

## 2017-08-02 PROCEDURE — 85027 COMPLETE CBC AUTOMATED: CPT

## 2017-08-02 NOTE — H&P PST ADULT - NSANTHOSAYNRD_GEN_A_CORE
No. AYDEN screening performed.  STOP BANG Legend: 0-2 = LOW Risk; 3-4 = INTERMEDIATE Risk; 5-8 = HIGH Risk

## 2017-08-02 NOTE — H&P PST ADULT - HISTORY OF PRESENT ILLNESS
on a follow pest scan for history of thryiod ca it was noted with lesion on the lungs 71 year old female with h/o  thyroid CA s/p thyroidectomy  HTN, HLD, bilateral carotid stents,  CAD with stent to RCA, LAD and in March 2017 s/p  successful PCI with BMSx2 to RCA ,  ESRD on dialysis (TU/TH/SU) with MD Bass.  Patient states on follow up PET scan revealed suspicious lung lesions bilaterally. Patient was originally schedule for VATS procedure but was canceled due to abnormal stress test.  She now present to PST for CT guided lung biopsy.

## 2017-08-09 ENCOUNTER — RESULT REVIEW (OUTPATIENT)
Age: 72
End: 2017-08-09

## 2017-08-09 ENCOUNTER — OUTPATIENT (OUTPATIENT)
Dept: OUTPATIENT SERVICES | Facility: HOSPITAL | Age: 72
LOS: 1 days | End: 2017-08-09
Payer: MEDICARE

## 2017-08-09 VITALS
RESPIRATION RATE: 16 BRPM | HEART RATE: 68 BPM | SYSTOLIC BLOOD PRESSURE: 175 MMHG | HEIGHT: 68 IN | OXYGEN SATURATION: 100 % | TEMPERATURE: 99 F | WEIGHT: 160.06 LBS | DIASTOLIC BLOOD PRESSURE: 66 MMHG

## 2017-08-09 VITALS
TEMPERATURE: 97 F | RESPIRATION RATE: 16 BRPM | OXYGEN SATURATION: 100 % | DIASTOLIC BLOOD PRESSURE: 63 MMHG | HEART RATE: 67 BPM | SYSTOLIC BLOOD PRESSURE: 153 MMHG

## 2017-08-09 DIAGNOSIS — I77.0 ARTERIOVENOUS FISTULA, ACQUIRED: Chronic | ICD-10-CM

## 2017-08-09 DIAGNOSIS — R07.1 CHEST PAIN ON BREATHING: ICD-10-CM

## 2017-08-09 DIAGNOSIS — Z01.818 ENCOUNTER FOR OTHER PREPROCEDURAL EXAMINATION: ICD-10-CM

## 2017-08-09 DIAGNOSIS — Z90.49 ACQUIRED ABSENCE OF OTHER SPECIFIED PARTS OF DIGESTIVE TRACT: Chronic | ICD-10-CM

## 2017-08-09 DIAGNOSIS — E89.0 POSTPROCEDURAL HYPOTHYROIDISM: Chronic | ICD-10-CM

## 2017-08-09 PROCEDURE — 77012 CT SCAN FOR NEEDLE BIOPSY: CPT | Mod: 26

## 2017-08-09 PROCEDURE — 32405: CPT | Mod: LT

## 2017-08-09 PROCEDURE — 88341 IMHCHEM/IMCYTCHM EA ADD ANTB: CPT

## 2017-08-09 PROCEDURE — 88342 IMHCHEM/IMCYTCHM 1ST ANTB: CPT | Mod: 26

## 2017-08-09 PROCEDURE — 88341 IMHCHEM/IMCYTCHM EA ADD ANTB: CPT | Mod: 26

## 2017-08-09 PROCEDURE — 88305 TISSUE EXAM BY PATHOLOGIST: CPT | Mod: 26

## 2017-08-09 PROCEDURE — 77012 CT SCAN FOR NEEDLE BIOPSY: CPT

## 2017-08-09 PROCEDURE — 71010: CPT | Mod: 26

## 2017-08-09 PROCEDURE — 88305 TISSUE EXAM BY PATHOLOGIST: CPT

## 2017-08-09 PROCEDURE — 71045 X-RAY EXAM CHEST 1 VIEW: CPT

## 2017-08-09 PROCEDURE — 88342 IMHCHEM/IMCYTCHM 1ST ANTB: CPT

## 2017-08-09 RX ORDER — ACETAMINOPHEN 500 MG
650 TABLET ORAL ONCE
Qty: 0 | Refills: 0 | Status: DISCONTINUED | OUTPATIENT
Start: 2017-08-09 | End: 2017-08-24

## 2017-08-09 NOTE — BRIEF OPERATIVE NOTE - OPERATION/FINDINGS
1) LLL nodule, appx 1.2 cm  2) 18G core x 4  3) Pathology assessment adequate  4) No PTX on post procedure chest CT

## 2017-08-09 NOTE — BRIEF OPERATIVE NOTE - PRE-OP DX
Lung nodule  08/09/2017  LLL, appx 1.2cm, Performed by Dr. Sanchez in IR with CT guidance, 18G core x 4  Active  Zachary Sanchez

## 2017-08-09 NOTE — BRIEF OPERATIVE NOTE - PROCEDURE
Lung biopsy, percutaneous needle  08/09/2017  LLL, Performed by Dr. Sanchez in IR with CT guidance, 18G core x 4  [facility]

## 2017-08-09 NOTE — ASU DISCHARGE PLAN (ADULT/PEDIATRIC). - NOTIFY
Bleeding that does not stop/shortness of breath, dizziness Bleeding that does not stop/Fever greater than 101/shortness of breath, dizziness

## 2017-08-30 ENCOUNTER — APPOINTMENT (OUTPATIENT)
Dept: THORACIC SURGERY | Facility: CLINIC | Age: 72
End: 2017-08-30
Payer: MEDICARE

## 2017-08-30 VITALS
RESPIRATION RATE: 16 BRPM | HEART RATE: 65 BPM | HEIGHT: 68 IN | DIASTOLIC BLOOD PRESSURE: 84 MMHG | SYSTOLIC BLOOD PRESSURE: 205 MMHG | OXYGEN SATURATION: 99 % | WEIGHT: 160 LBS | BODY MASS INDEX: 24.25 KG/M2

## 2017-08-30 PROCEDURE — 99215 OFFICE O/P EST HI 40 MIN: CPT

## 2017-08-30 RX ORDER — CEPHALEXIN 500 MG/1
500 CAPSULE ORAL
Qty: 14 | Refills: 0 | Status: COMPLETED | COMMUNITY
Start: 2017-04-03

## 2017-08-30 RX ORDER — AMOXICILLIN AND CLAVULANATE POTASSIUM 875; 125 MG/1; MG/1
875-125 TABLET, COATED ORAL
Qty: 20 | Refills: 0 | Status: COMPLETED | COMMUNITY
Start: 2017-03-24

## 2017-08-30 RX ORDER — PREDNISONE 10 MG/1
10 TABLET ORAL
Qty: 6 | Refills: 0 | Status: COMPLETED | COMMUNITY
Start: 2017-03-23

## 2017-12-20 NOTE — ASU DISCHARGE PLAN (ADULT/PEDIATRIC). - C. MAKE IMPORTANT PERSONAL OR BUSINESS DECISIONS
Dr. Eliel Owusu told pt last night to give her wound 3 good rinses and then take the bandage off. Said she was washing her hair this morning and it feel off. Just wants to make sure this is OK? She went ahead and put the antibiotic on it.  Just wanted to check in
Pt aware it is ok that the packing came out. Pt aware to keep clean and apply the ointment around the wound.  Pt aware and v/u. omar
Statement Selected

## 2018-02-05 ENCOUNTER — APPOINTMENT (OUTPATIENT)
Dept: CARDIOLOGY | Facility: CLINIC | Age: 73
End: 2018-02-05
Payer: MEDICARE

## 2018-02-05 VITALS
BODY MASS INDEX: 25.8 KG/M2 | HEART RATE: 61 BPM | DIASTOLIC BLOOD PRESSURE: 60 MMHG | SYSTOLIC BLOOD PRESSURE: 142 MMHG | HEIGHT: 68 IN | WEIGHT: 170.25 LBS | RESPIRATION RATE: 12 BRPM

## 2018-02-05 PROCEDURE — 93000 ELECTROCARDIOGRAM COMPLETE: CPT

## 2018-02-05 PROCEDURE — 99214 OFFICE O/P EST MOD 30 MIN: CPT

## 2018-02-05 RX ORDER — NYSTATIN 100000 [USP'U]/G
100000 CREAM TOPICAL
Qty: 30 | Refills: 0 | Status: DISCONTINUED | COMMUNITY
Start: 2018-01-15

## 2018-02-07 ENCOUNTER — RX RENEWAL (OUTPATIENT)
Age: 73
End: 2018-02-07

## 2018-02-14 ENCOUNTER — APPOINTMENT (OUTPATIENT)
Dept: CARDIOLOGY | Facility: CLINIC | Age: 73
End: 2018-02-14
Payer: MEDICARE

## 2018-02-14 PROCEDURE — 93970 EXTREMITY STUDY: CPT

## 2018-02-14 PROCEDURE — 93880 EXTRACRANIAL BILAT STUDY: CPT

## 2018-02-15 ENCOUNTER — RX RENEWAL (OUTPATIENT)
Age: 73
End: 2018-02-15

## 2018-03-23 ENCOUNTER — APPOINTMENT (OUTPATIENT)
Dept: CARDIOLOGY | Facility: CLINIC | Age: 73
End: 2018-03-23
Payer: MEDICARE

## 2018-03-23 PROCEDURE — 93925 LOWER EXTREMITY STUDY: CPT

## 2018-04-04 ENCOUNTER — RX RENEWAL (OUTPATIENT)
Age: 73
End: 2018-04-04

## 2018-08-13 ENCOUNTER — APPOINTMENT (OUTPATIENT)
Dept: CARDIOLOGY | Facility: CLINIC | Age: 73
End: 2018-08-13

## 2018-10-03 PROBLEM — N18.6 END STAGE RENAL DISEASE: Chronic | Status: ACTIVE | Noted: 2017-03-27

## 2018-11-09 ENCOUNTER — APPOINTMENT (OUTPATIENT)
Dept: CARDIOLOGY | Facility: CLINIC | Age: 73
End: 2018-11-09

## 2018-11-20 NOTE — H&P PST ADULT - NSANTHOBSERVEDRD_ENT_A_CORE
based on clinical progression. I am managing a portion of pt care. Some medical issues are handled by other specialists. Additional work up and treatment should be done in out pt setting by pt PCP and other out pt providers. In addition to examining and evaluating pt, I spent additional time explaining care, normal and abnormal findings, and treatment plan. All of pt questions were answered. Counseling, diet and education were  provided. Case will be discussed with nursing staff when appropriate. Family will be updated if and when appropriate.       Diet: Diet NPO Effective Now    Code Status: Full Code    PT/OT Eval           Electronically signed by ASIM Maxwell CNP on 11/20/2018 at 4:07 PM No

## 2018-12-06 ENCOUNTER — RX RENEWAL (OUTPATIENT)
Age: 73
End: 2018-12-06

## 2018-12-11 PROBLEM — M10.9 GOUT: Status: ACTIVE | Noted: 2018-12-11

## 2018-12-11 RX ORDER — LEVOTHYROXINE SODIUM 200 UG/1
200 TABLET ORAL
Refills: 0 | Status: ACTIVE | COMMUNITY

## 2018-12-19 ENCOUNTER — APPOINTMENT (OUTPATIENT)
Dept: CARDIOLOGY | Facility: CLINIC | Age: 73
End: 2018-12-19
Payer: MEDICARE

## 2018-12-19 VITALS
DIASTOLIC BLOOD PRESSURE: 65 MMHG | HEIGHT: 68 IN | SYSTOLIC BLOOD PRESSURE: 165 MMHG | HEART RATE: 65 BPM | RESPIRATION RATE: 12 BRPM | WEIGHT: 179 LBS | BODY MASS INDEX: 27.13 KG/M2

## 2018-12-19 DIAGNOSIS — C73 MALIGNANT NEOPLASM OF THYROID GLAND: ICD-10-CM

## 2018-12-19 PROCEDURE — 93306 TTE W/DOPPLER COMPLETE: CPT

## 2018-12-19 PROCEDURE — 93978 VASCULAR STUDY: CPT

## 2018-12-19 PROCEDURE — 99214 OFFICE O/P EST MOD 30 MIN: CPT

## 2018-12-19 PROCEDURE — 93925 LOWER EXTREMITY STUDY: CPT

## 2018-12-19 PROCEDURE — 93000 ELECTROCARDIOGRAM COMPLETE: CPT

## 2018-12-19 RX ORDER — HYDROCORTISONE 25 MG/G
2.5 CREAM TOPICAL
Qty: 30 | Refills: 0 | Status: DISCONTINUED | COMMUNITY
Start: 2018-01-15 | End: 2018-12-19

## 2018-12-19 NOTE — PHYSICAL EXAM
[General Appearance - Well Developed] : well developed [Normal Appearance] : normal appearance [Well Groomed] : well groomed [General Appearance - Well Nourished] : well nourished [No Deformities] : no deformities [General Appearance - In No Acute Distress] : no acute distress [Normal Conjunctiva] : the conjunctiva exhibited no abnormalities [Eyelids - No Xanthelasma] : the eyelids demonstrated no xanthelasmas [Normal Oral Mucosa] : normal oral mucosa [No Oral Pallor] : no oral pallor [No Oral Cyanosis] : no oral cyanosis [Respiration, Rhythm And Depth] : normal respiratory rhythm and effort [Exaggerated Use Of Accessory Muscles For Inspiration] : no accessory muscle use [Auscultation Breath Sounds / Voice Sounds] : lungs were clear to auscultation bilaterally [Abdomen Soft] : soft [Abdomen Tenderness] : non-tender [Abdomen Mass (___ Cm)] : no abdominal mass palpated [Abnormal Walk] : normal gait [Gait - Sufficient For Exercise Testing] : the gait was sufficient for exercise testing [Nail Clubbing] : no clubbing of the fingernails [Cyanosis, Localized] : no localized cyanosis [Petechial Hemorrhages (___cm)] : no petechial hemorrhages [FreeTextEntry1] : 1+ bilateral ankle edema [Skin Color & Pigmentation] : normal skin color and pigmentation [] : no rash [No Venous Stasis] : no venous stasis [Skin Lesions] : no skin lesions [No Skin Ulcers] : no skin ulcer [No Xanthoma] : no  xanthoma was observed [Oriented To Time, Place, And Person] : oriented to person, place, and time [Affect] : the affect was normal [Mood] : the mood was normal [No Anxiety] : not feeling anxious [Normal Rate] : normal [Normal S1] : normal S1 [Normal S2] : normal S2 [S3] : no S3 [S4] : no S4 [No Murmur] : no murmurs heard [III] : a grade 3 [1+] : left 1+ [Right Carotid Bruit] : right carotid bruit heard [Left Carotid Bruit] : left carotid bruit heard [2+] : left 2+ [Right Femoral Bruit] : no bruit heard over the right femoral artery [Left Femoral Bruit] : no bruit heard over the left femoral artery [0] : left 0 [No Abnormalities] : the abdominal aorta was not enlarged and no bruit was heard [___ +] : [unfilled]U+ pitting edema to L ankle

## 2018-12-19 NOTE — REASON FOR VISIT
[FreeTextEntry1] : Patient presents here for reevaluation after completing 2 rounds of radiation therapy for left and right-sided lung metastases of her thyroid cancer\par \par She has a  multiplicity of problems as outlined below. \par Her  concerns about pain in her left calf which has increased and limits ambulation and activity considerably. and some swelling around the left ankle.\par \par BP continues to be elevated except after HD\par Now being treated for a cough with mucous expectoration consistent with a bronchitis.\par

## 2018-12-19 NOTE — HISTORY OF PRESENT ILLNESS
[FreeTextEntry1] :  Ms. Hussein presents here for cardiac reevaluation.  \par Status post bare-metal stenting of the RCA on March 30, 2017.  \par Other disease at that time include a nonobstructive LAD and ramus disease,\par Severe disease in proximal circumflex and posterior LV branch.\par \par Other history includes,\par 1.	End-stage renal disease, on dialysis.\par 2.	Extensive bilateral carotid disease with prior stent.\par 3.	Chronic anemia.\par 4.	Very remote LAD and RCA stenting.\par \par She continues to have exertional dyspnea which is somewhat stable and has not progressed.\par \par She continues to get chest pain but feels is most affected by changes in weather.\par \par She is very concerned about some recent onset of left lower extremity ankle edema.\par There is also pain in the calf which seems to be unrelated but is worse when she ambulates.\par \par She was also diagnosed with reoccurrence of her thyroid cancer with local disease in her neck, as well as, metastases to her lungs.\par

## 2018-12-19 NOTE — ASSESSMENT
[FreeTextEntry1] : ECG: Normal sinus rhythm 65 beats a minute with occasional APCs poor R. progression and diffuse ST and T wave abnormalities inferiorly anterolaterally which are chronic. Prolonged QTc 480 msec\par \par Carotid duplex 2/14/18:\par Left carotid large plaque left proximal common carotid artery with moderate plaquing extending into the left internal carotid.\par Moderate plaquing throughout the right common and internal carotid arteries.\par Greater than 50% high lateral common carotid artery stenosis.\par 50-60% proximal left internal carotid artery stenosis.\par 1-49% proximal right internal carotid artery stenosis.\par Left ECA totally occluded\par Compared with the prior study there is no significant interval change.\par \par Lower extremity arterial duplex study 3/23/18:\par Moderate diffuse atherosclerotic disease.\par 50-99% of the right profunda artery\par Generally diminished waveforms and consideration of aortoiliac disease as mentioned\par \par \par Impression:\par 1. Patient continues to have very labile hypertension which seems to be exacerbated when she is going for dialysis. She is compliant with her meds  except Hydralazine which is being taken 1-2 times daily. and a low sodium diet. Problematic is that when her blood pressure goes below 140 she becomes symptomatic.\par \par 2. The carotid disease has been moderately significant but unchanged on the last study\par \par 3. Dyspnea has been chronic in nature does not seem to have progressed and reflected any new cardiac pathology. is being treated for bronchitis\par \par 4 thyroid cancer has recurred with local and pulm mets, now s/p bilateral lung radiation\par \par 5. The unilateral calf edema is very mild. and there was no DVT on the venous doppler\par \par 6 there is increased claudication and absence of pulses bilaterally and we will repeat arterial duplex and examine the iliacs\par \par Discuss all the above with the patient who understands and agrees copies of blood work from Dr. Rodrigo Lopez's office will be obtained. No changes in meds at this time.

## 2019-01-25 ENCOUNTER — RECORD ABSTRACTING (OUTPATIENT)
Age: 74
End: 2019-01-25

## 2019-01-25 DIAGNOSIS — R53.83 OTHER FATIGUE: ICD-10-CM

## 2019-01-25 DIAGNOSIS — D64.9 ANEMIA, UNSPECIFIED: ICD-10-CM

## 2019-02-25 ENCOUNTER — RX RENEWAL (OUTPATIENT)
Age: 74
End: 2019-02-25

## 2019-03-18 ENCOUNTER — APPOINTMENT (OUTPATIENT)
Dept: CARDIOLOGY | Facility: CLINIC | Age: 74
End: 2019-03-18
Payer: MEDICARE

## 2019-03-18 ENCOUNTER — NON-APPOINTMENT (OUTPATIENT)
Age: 74
End: 2019-03-18

## 2019-03-18 VITALS
HEART RATE: 67 BPM | HEIGHT: 68 IN | DIASTOLIC BLOOD PRESSURE: 60 MMHG | WEIGHT: 174 LBS | RESPIRATION RATE: 14 BRPM | SYSTOLIC BLOOD PRESSURE: 140 MMHG | BODY MASS INDEX: 26.37 KG/M2

## 2019-03-18 DIAGNOSIS — R94.2 ABNORMAL RESULTS OF PULMONARY FUNCTION STUDIES: ICD-10-CM

## 2019-03-18 DIAGNOSIS — R60.0 LOCALIZED EDEMA: ICD-10-CM

## 2019-03-18 PROCEDURE — 99214 OFFICE O/P EST MOD 30 MIN: CPT

## 2019-03-18 PROCEDURE — 93000 ELECTROCARDIOGRAM COMPLETE: CPT

## 2019-03-18 NOTE — REASON FOR VISIT
[FreeTextEntry1] : Patient presents here for reevaluation \par Has completed 2 rounds of radiation therapy for left and right-sided lung metastases of her thyroid cancer\par awaiting a repeat scan and directions\par \par She has a  multiplicity of problems as outlined below. \par pain in her left calf has now become bilateral with leg "heaviness" and limits ambulation and activity considerably. \par \par BP continues to be elevated except after HD\par \par Complains of increased degree of exertional difficulty and shortness of breath with some occasional chest discomfort.\par

## 2019-03-18 NOTE — PHYSICAL EXAM
[General Appearance - Well Developed] : well developed [Normal Appearance] : normal appearance [Well Groomed] : well groomed [General Appearance - Well Nourished] : well nourished [No Deformities] : no deformities [General Appearance - In No Acute Distress] : no acute distress [Normal Conjunctiva] : the conjunctiva exhibited no abnormalities [Eyelids - No Xanthelasma] : the eyelids demonstrated no xanthelasmas [Normal Oral Mucosa] : normal oral mucosa [No Oral Pallor] : no oral pallor [No Oral Cyanosis] : no oral cyanosis [Respiration, Rhythm And Depth] : normal respiratory rhythm and effort [Exaggerated Use Of Accessory Muscles For Inspiration] : no accessory muscle use [Auscultation Breath Sounds / Voice Sounds] : lungs were clear to auscultation bilaterally [Abdomen Soft] : soft [Abdomen Tenderness] : non-tender [Abdomen Mass (___ Cm)] : no abdominal mass palpated [Abnormal Walk] : normal gait [Gait - Sufficient For Exercise Testing] : the gait was sufficient for exercise testing [Cyanosis, Localized] : no localized cyanosis [Nail Clubbing] : no clubbing of the fingernails [Petechial Hemorrhages (___cm)] : no petechial hemorrhages [FreeTextEntry1] : 1+ bilateral ankle edema [Skin Color & Pigmentation] : normal skin color and pigmentation [] : no rash [No Venous Stasis] : no venous stasis [Skin Lesions] : no skin lesions [No Skin Ulcers] : no skin ulcer [No Xanthoma] : no  xanthoma was observed [Affect] : the affect was normal [Oriented To Time, Place, And Person] : oriented to person, place, and time [Mood] : the mood was normal [No Anxiety] : not feeling anxious [Normal Rate] : normal [Normal S1] : normal S1 [Normal S2] : normal S2 [S3] : no S3 [S4] : no S4 [No Murmur] : no murmurs heard [III] : a grade 3 [1+] : left 1+ [Left Carotid Bruit] : left carotid bruit heard [Right Carotid Bruit] : right carotid bruit heard [2+] : right 2+ [Right Femoral Bruit] : no bruit heard over the right femoral artery [Left Femoral Bruit] : no bruit heard over the left femoral artery [0] : left 0 [No Abnormalities] : the abdominal aorta was not enlarged and no bruit was heard [___ +] : [unfilled]U+ pitting edema to L ankle

## 2019-03-18 NOTE — ASSESSMENT
[FreeTextEntry1] : ECG: Normal sinus rhythm 67 beats a minute with occasional APCs poor R. progression LVH with diffuse ST and T wave abnormalities inferiorly anterolaterally which are chronic. \par \par Lower extremity arterial duplex 12/20/18:\par Moderate to severe plaquing bilaterally without any evidence of a hemodynamically significant stenosis.\par \par Lower extremity arterial duplex study 3/23/18:\par Moderate diffuse atherosclerotic disease.\par 50-99% of the right profunda artery\par Generally diminished waveforms and consideration of aortoiliac disease as mentioned\par \par Carotid duplex 2/14/18:\par Left carotid large plaque left proximal common carotid artery with moderate plaquing extending into the left internal carotid.\par Moderate plaquing throughout the right common and internal carotid arteries.\par Greater than 50% high lateral common carotid artery stenosis.\par 50-60% proximal left internal carotid artery stenosis.\par 1-49% proximal right internal carotid artery stenosis.\par Left ECA totally occluded\par Compared with the prior study there is no significant interval change.\par \par Impression:\par 1. Patient continues to have very labile hypertension which seems to be exacerbated when she is going for dialysis. She is compliant with her meds  except Hydralazine which is being taken 1-2 times daily. and a low sodium diet. Problematic is that when her blood pressure goes below 140 she becomes symptomatic.\par \par 2. The carotid disease has been moderately significant but unchanged on the last study\par \par 3. Dyspnea had  been chronic in nature possibly seems to have progressed and may reflect effects of radiation though CAD cannot be excluded\par \par 4 thyroid cancer has recurred with local and pulm mets, now s/p bilateral lung radiation\par \par 5. The unilateral calf edema is very mild. and there was no DVT on the venous doppler\par \par 6 would not seem that  increased claudication is due to progressive PAD based on the repeat arterial duplex\par \par Discuss all the above with the patient who understands and agrees \par copies of blood work from Dr. Rodrigo Lopez's office will be obtained. \par No changes in meds at this time.

## 2019-03-18 NOTE — HISTORY OF PRESENT ILLNESS
[FreeTextEntry1] :  Ms. Hussein presents here for cardiac reevaluation.  \par \par - Status post bare-metal stenting of the RCA on March 30, 2017.  \par - Other disease at that time include a nonobstructive LAD and ramus disease,\par - Severe disease in proximal circumflex and posterior LV branch.\par \par Other history includes,\par 1.	End-stage renal disease, on dialysis.\par 2.	Extensive bilateral carotid disease with prior stent.\par 3.	Chronic anemia.Current Hgb reportedly stable about 10\par 4.	Very remote LAD and RCA stenting.\par \par She was also diagnosed with reoccurrence of her thyroid cancer with local disease in her neck, as well as, metastases to her lungs.\par

## 2019-03-25 ENCOUNTER — MOBILE ON CALL (OUTPATIENT)
Age: 74
End: 2019-03-25

## 2019-05-24 ENCOUNTER — RX RENEWAL (OUTPATIENT)
Age: 74
End: 2019-05-24

## 2019-05-29 ENCOUNTER — APPOINTMENT (OUTPATIENT)
Dept: CARDIOLOGY | Facility: CLINIC | Age: 74
End: 2019-05-29

## 2019-06-17 ENCOUNTER — RX RENEWAL (OUTPATIENT)
Age: 74
End: 2019-06-17

## 2019-06-17 ENCOUNTER — MEDICATION RENEWAL (OUTPATIENT)
Age: 74
End: 2019-06-17

## 2019-07-01 ENCOUNTER — CLINICAL ADVICE (OUTPATIENT)
Age: 74
End: 2019-07-01

## 2019-09-18 ENCOUNTER — APPOINTMENT (OUTPATIENT)
Dept: CARDIOLOGY | Facility: CLINIC | Age: 74
End: 2019-09-18
Payer: MEDICARE

## 2019-09-18 ENCOUNTER — NON-APPOINTMENT (OUTPATIENT)
Age: 74
End: 2019-09-18

## 2019-09-18 ENCOUNTER — RX RENEWAL (OUTPATIENT)
Age: 74
End: 2019-09-18

## 2019-09-18 VITALS
RESPIRATION RATE: 14 BRPM | BODY MASS INDEX: 21.37 KG/M2 | SYSTOLIC BLOOD PRESSURE: 140 MMHG | HEIGHT: 68 IN | WEIGHT: 141 LBS | HEART RATE: 77 BPM | DIASTOLIC BLOOD PRESSURE: 70 MMHG

## 2019-09-18 PROCEDURE — 99214 OFFICE O/P EST MOD 30 MIN: CPT

## 2019-09-18 PROCEDURE — 93000 ELECTROCARDIOGRAM COMPLETE: CPT

## 2019-09-18 RX ORDER — APIXABAN 2.5 MG/1
2.5 TABLET, FILM COATED ORAL
Qty: 180 | Refills: 3 | Status: ACTIVE | COMMUNITY
Start: 2019-09-18

## 2019-09-18 NOTE — REASON FOR VISIT
[Follow-Up - Clinic] : a clinic follow-up of [FreeTextEntry1] : The patient is a 73-year-old white female with known history of coronary disease, (status post multiple PCI's-last bare-metal stent to proximal to mid RCA March 2017), ESRD on dialysis for almost 4 years, diabetes, thyroid cancer and lung cancer treated with RT [facility] and recent pneumonia (Dr. Mora) who was recently admitted to [facility] from dialysis unit after developing new onset rapid PAF;\par \par Patient denied any associated chest pain but did have some release of cardiac troponin which was not clear whether this represented ischemia and or chronic renal disease;\par \par She was placed on liquids at discharge (2.5 b.i.d.);\par There is a prior history of anemia and possible GI bleed and she had arrangements to have a bleeding type scan or study at [facility] coming up in the near future;\par

## 2019-09-18 NOTE — HISTORY OF PRESENT ILLNESS
[FreeTextEntry1] : Since discharge home, patient states she has been somewhat fatigued and has some vague chest discomfort which she feels is left over from previous attempt at CPR when she had a syncopal episode on dialysis earlier this year;\par \par Overall, she states she is tolerating her current medical regimen and has not noted any overt bleeding;\par \par She failed to bring her most recent discharge medications with her to list as certain things were changed;\par

## 2019-09-18 NOTE — PHYSICAL EXAM
[Normal Conjunctiva] : the conjunctiva exhibited no abnormalities [Normal Oral Mucosa] : normal oral mucosa [Eyelids - No Xanthelasma] : the eyelids demonstrated no xanthelasmas [No Oral Cyanosis] : no oral cyanosis [No Oral Pallor] : no oral pallor [Normal Jugular Venous V Waves Present] : normal jugular venous V waves present [Normal Jugular Venous A Waves Present] : normal jugular venous A waves present [No Jugular Venous Schaefer A Waves] : no jugular venous schaefer A waves [Respiration, Rhythm And Depth] : normal respiratory rhythm and effort [Exaggerated Use Of Accessory Muscles For Inspiration] : no accessory muscle use [Auscultation Breath Sounds / Voice Sounds] : lungs were clear to auscultation bilaterally [Abdomen Soft] : soft [Abdomen Tenderness] : non-tender [Abdomen Mass (___ Cm)] : no abdominal mass palpated [FreeTextEntry1] : Large AV fistula patent in left upper extremity -with a palpable bruit [Skin Color & Pigmentation] : normal skin color and pigmentation [No Venous Stasis] : no venous stasis [] : no rash [Skin Lesions] : no skin lesions [No Skin Ulcers] : no skin ulcer [No Xanthoma] : no  xanthoma was observed [Oriented To Time, Place, And Person] : oriented to person, place, and time [No Anxiety] : not feeling anxious [Affect] : the affect was normal [Mood] : the mood was normal

## 2019-09-18 NOTE — ASSESSMENT
[FreeTextEntry1] : EKG shows normal sinus rhythm with poor R-wave progression V1 to V3 with QS pattern and nonspecific T-wave flattening;\par \par in summary the patient is a 73-year-old woman who has had a recent hospitalization for paroxysmal rapid atrial fibrillation while doing dialysis which he has been on for approximately 3-4 years;\par \par She demonstrated elevated cardiac troponins but it was not clear whether this was ischemic or nonischemic causes such as chronic renal failure;\par \par Plan:\par \par Recommend patient schedule; nuclear stress test later this fall to rule out any significant ischemia\par \par Patient to continue current medical regimen advise us of her; recent medication changes\par \par Concerned about history of chronic anemia and possible GI bleed while on; anticoagulation and enteric-coated aspirin\par \par Timely checkups and laboratory blood tests with Dr. Lopez encouraged\par \par Agree with following through on GI scan or evaluation for GI bleed;

## 2019-09-19 ENCOUNTER — MEDICATION RENEWAL (OUTPATIENT)
Age: 74
End: 2019-09-19

## 2020-01-03 ENCOUNTER — APPOINTMENT (OUTPATIENT)
Dept: CARDIOLOGY | Facility: CLINIC | Age: 75
End: 2020-01-03

## 2020-01-08 ENCOUNTER — MOBILE ON CALL (OUTPATIENT)
Age: 75
End: 2020-01-08

## 2020-01-13 ENCOUNTER — APPOINTMENT (OUTPATIENT)
Dept: CARDIOLOGY | Facility: CLINIC | Age: 75
End: 2020-01-13

## 2020-09-14 ENCOUNTER — NON-APPOINTMENT (OUTPATIENT)
Age: 75
End: 2020-09-14

## 2020-09-14 ENCOUNTER — APPOINTMENT (OUTPATIENT)
Dept: CARDIOLOGY | Facility: CLINIC | Age: 75
End: 2020-09-14
Payer: MEDICARE

## 2020-09-14 VITALS
WEIGHT: 125 LBS | BODY MASS INDEX: 18.94 KG/M2 | RESPIRATION RATE: 14 BRPM | HEART RATE: 73 BPM | DIASTOLIC BLOOD PRESSURE: 60 MMHG | TEMPERATURE: 97.8 F | HEIGHT: 68 IN | SYSTOLIC BLOOD PRESSURE: 134 MMHG

## 2020-09-14 DIAGNOSIS — I73.9 PERIPHERAL VASCULAR DISEASE, UNSPECIFIED: ICD-10-CM

## 2020-09-14 DIAGNOSIS — I65.23 OCCLUSION AND STENOSIS OF BILATERAL CAROTID ARTERIES: ICD-10-CM

## 2020-09-14 PROCEDURE — 93000 ELECTROCARDIOGRAM COMPLETE: CPT

## 2020-09-14 PROCEDURE — 99215 OFFICE O/P EST HI 40 MIN: CPT

## 2020-09-14 RX ORDER — DILTIAZEM HYDROCHLORIDE 120 MG/1
120 CAPSULE, EXTENDED RELEASE ORAL DAILY
Qty: 90 | Refills: 3 | Status: DISCONTINUED | COMMUNITY
Start: 1900-01-01 | End: 2020-09-14

## 2020-09-14 RX ORDER — FEBUXOSTAT 40 MG/1
40 TABLET ORAL
Refills: 0 | Status: DISCONTINUED | COMMUNITY
End: 2020-09-14

## 2020-09-14 RX ORDER — ENALAPRIL MALEATE 5 MG/1
5 TABLET ORAL DAILY
Refills: 0 | Status: ACTIVE | COMMUNITY

## 2020-09-14 RX ORDER — EZETIMIBE 10 MG/1
10 TABLET ORAL DAILY
Refills: 0 | Status: ACTIVE | COMMUNITY

## 2020-09-14 RX ORDER — CEPHALEXIN 250 MG/1
250 CAPSULE ORAL
Qty: 20 | Refills: 0 | Status: DISCONTINUED | COMMUNITY
Start: 2018-01-18 | End: 2020-09-14

## 2020-09-14 RX ORDER — AMLODIPINE BESYLATE 5 MG/1
5 TABLET ORAL DAILY
Refills: 0 | Status: ACTIVE | COMMUNITY

## 2020-09-14 NOTE — ASSESSMENT
[FreeTextEntry1] : Normal sinus rhythm at a rate of 73. Poor R-wave progression V1 to V3 with nonspecific ST-T wave changes diffuse;\par \par In summary the patient is a 74-year-old woman who has a history of recent cerebral strokes and mild right hemiparesthesias, and periods of dizziness/lightheadedness who is been on multiple medical regimens with known history of prior coronary disease and multiple PCI's, chronic hemodialysis and partial lung resections with some exertional dyspnea at times;\par \par Plan:\par \par Recommendation discontinue the diltiazem;\par \par Would consider further tapering back on metoprolol and amlodipine;\par \par Patient to carefully monitor blood pressure at this time and report any additional significant lightheadedness or dizziness;\par \par Will schedule echocardiogram and nuclear stress test sometime in the near future to assess her cardiac function and rule out any significant coronary ischemia;\par \par To followup with primary care for timely checkups and laboratory blood tests as well;

## 2020-09-14 NOTE — REASON FOR VISIT
[Follow-Up - Clinic] : a clinic follow-up of [FreeTextEntry1] : The patient is a 74-year-old white female with a complicated medical history involving known coronary artery disease and multiple prior PCI/stents ( most recent was bare metal stent to RCA-- March 2017);\par \par Patient also has a history of lung cancer treated with radiation therapy at [facility], pneumonia in 2019, end-stage renal disease who has been on hemodialysis almost 5 years, and unfortunately recently, in the spring developed multi-cerebral infarcts/strokes with resulting right hemiparesthesias and requiring prolonged physical therapy which she was released last month from [facility];\par \par Patient has been convalescing slowly and has been ambulating on her own and just recently started driving again;;\par \par She does have some complaints of exertional dyspnea at times but denies chest pain, palpitations, dizziness or syncope;

## 2020-09-14 NOTE — PHYSICAL EXAM
[Eyelids - No Xanthelasma] : the eyelids demonstrated no xanthelasmas [Normal Conjunctiva] : the conjunctiva exhibited no abnormalities [No Oral Pallor] : no oral pallor [Normal Oral Mucosa] : normal oral mucosa [No Oral Cyanosis] : no oral cyanosis [Normal Jugular Venous A Waves Present] : normal jugular venous A waves present [No Jugular Venous Schaefer A Waves] : no jugular venous schaefer A waves [Normal Jugular Venous V Waves Present] : normal jugular venous V waves present [Exaggerated Use Of Accessory Muscles For Inspiration] : no accessory muscle use [Respiration, Rhythm And Depth] : normal respiratory rhythm and effort [Auscultation Breath Sounds / Voice Sounds] : lungs were clear to auscultation bilaterally [Abdomen Soft] : soft [Abdomen Tenderness] : non-tender [Abdomen Mass (___ Cm)] : no abdominal mass palpated [Skin Color & Pigmentation] : normal skin color and pigmentation [] : no rash [No Venous Stasis] : no venous stasis [No Skin Ulcers] : no skin ulcer [Skin Lesions] : no skin lesions [No Xanthoma] : no  xanthoma was observed [Oriented To Time, Place, And Person] : oriented to person, place, and time [Affect] : the affect was normal [Mood] : the mood was normal [FreeTextEntry1] : Questionable intermittent memory lapses; slightly anxious in appearance;

## 2020-09-14 NOTE — REVIEW OF SYSTEMS
[Recent Weight Loss (___ Lbs)] : recent [unfilled] ~Ulb weight loss [Feeling Fatigued] : feeling fatigued [Limb Weakness (Paresis)] : limb weakness [Joint Stiffness] : joint stiffness [Dizziness] : dizziness [Negative] : Heme/Lymph

## 2020-09-14 NOTE — HISTORY OF PRESENT ILLNESS
[FreeTextEntry1] : She is concerned about lightheadedness feeling that she's been getting during the week but not sure if she pinpoints this to the dialysis days.;\par She states her blood pressure is running in the lower normal range most times;\par \par

## 2020-11-02 ENCOUNTER — APPOINTMENT (OUTPATIENT)
Dept: CARDIOLOGY | Facility: CLINIC | Age: 75
End: 2020-11-02

## 2020-12-02 ENCOUNTER — APPOINTMENT (OUTPATIENT)
Dept: CARDIOLOGY | Facility: CLINIC | Age: 75
End: 2020-12-02
Payer: MEDICARE

## 2020-12-02 PROCEDURE — 93306 TTE W/DOPPLER COMPLETE: CPT

## 2020-12-03 ENCOUNTER — NON-APPOINTMENT (OUTPATIENT)
Age: 75
End: 2020-12-03

## 2020-12-10 ENCOUNTER — NON-APPOINTMENT (OUTPATIENT)
Age: 75
End: 2020-12-10

## 2020-12-16 ENCOUNTER — APPOINTMENT (OUTPATIENT)
Dept: CARDIOLOGY | Facility: CLINIC | Age: 75
End: 2020-12-16
Payer: MEDICARE

## 2020-12-16 ENCOUNTER — NON-APPOINTMENT (OUTPATIENT)
Age: 75
End: 2020-12-16

## 2020-12-16 VITALS
WEIGHT: 135 LBS | HEIGHT: 68 IN | DIASTOLIC BLOOD PRESSURE: 60 MMHG | BODY MASS INDEX: 20.46 KG/M2 | RESPIRATION RATE: 14 BRPM | HEART RATE: 88 BPM | SYSTOLIC BLOOD PRESSURE: 138 MMHG | TEMPERATURE: 97.4 F

## 2020-12-16 DIAGNOSIS — Z95.5 PRESENCE OF CORONARY ANGIOPLASTY IMPLANT AND GRAFT: ICD-10-CM

## 2020-12-16 DIAGNOSIS — Z01.811 ENCOUNTER FOR PREPROCEDURAL RESPIRATORY EXAMINATION: ICD-10-CM

## 2020-12-16 PROCEDURE — 93000 ELECTROCARDIOGRAM COMPLETE: CPT

## 2020-12-16 PROCEDURE — 99214 OFFICE O/P EST MOD 30 MIN: CPT

## 2020-12-16 RX ORDER — CLOPIDOGREL BISULFATE 75 MG/1
75 TABLET, FILM COATED ORAL DAILY
Qty: 1 | Refills: 1 | Status: ACTIVE | COMMUNITY
Start: 2020-12-16

## 2020-12-16 NOTE — ASSESSMENT
[FreeTextEntry1] : EKG: Sinus rhythm with diffuse nonspecific ST and T wave changes appearing more prominent than her prior EKG.\par \par 75-year-old female with a past medical history of CAD status post multiple stents, hypertension, diabetes, PAD, paroxysmal atrial fibrillation on Eliquis, COPD, end-stage renal disease on hemodialysis who presents to me for follow-up status post recent admission to the hospital with non-ST elevation MI status post multiple stents.  Patient did well with her stenting but unfortunately has developed a significant hematoma in her right groin.  It is firm although does have some soft spots.  The patient reports it is slowly getting better.  There is a lot of ecchymosis throughout the lower extremities.  There is no evidence of ongoing ischemia at this time and no evidence of heart failure.  She is on triple therapy at this time and given the MI we will continue on it for now but I would consider discontinuation of Plavix in the next 3 to 6 months.  There is no other evidence of significant bleeding for now.  She is currently in sinus rhythm and has no symptoms of atrial fibrillation.

## 2020-12-16 NOTE — DISCUSSION/SUMMARY
[FreeTextEntry1] : 1.  Check right groin ultrasound to rule out pseudoaneurysm.  Discussed with patient's interventional list.  Also will check CBC with her next dialysis.\par 2.  Continue current cardiac meds in doses as noted above for CAD, PAD, hypertension, atrial fibrillation.\par 3.  Dialysis per renal.\par 4.  Consider discontinuation of Plavix in 3 to 6 months.  Continue anticoagulation for stroke prophylaxis for atrial fibrillation.\par 5.  Encourage patient to plan follow-up with Dr. Lemos in 1 month.

## 2020-12-16 NOTE — PHYSICAL EXAM
[General Appearance - In No Acute Distress] : no acute distress [Normal Conjunctiva] : the conjunctiva exhibited no abnormalities [Normal Oral Mucosa] : normal oral mucosa [Auscultation Breath Sounds / Voice Sounds] : lungs were clear to auscultation bilaterally [Abdomen Soft] : soft [Abdomen Tenderness] : non-tender [Abnormal Walk] : normal gait [Nail Clubbing] : no clubbing of the fingernails [Cyanosis, Localized] : no localized cyanosis [Skin Color & Pigmentation] : normal skin color and pigmentation [Normal Rate] : normal [Rhythm Regular] : regular [Normal S1] : normal S1 [Normal S2] : normal S2 [S3] : no S3 [S4] : an S4 was heard [II] : a grade 2 [FreeTextEntry1] : Large area of ecchymosis over the right groin and right lower abdomen and upper thigh.  Firm to palpation but with some soft areas.  Diffuse ecchymosis through the lower extremities and lower abdomen. [Oriented To Time, Place, And Person] : oriented to person, place, and time

## 2020-12-16 NOTE — HISTORY OF PRESENT ILLNESS
[FreeTextEntry1] : Patient presents today having recently been in the hospital where she presented with chest pain and palpitations from dialysis.  She was noted to have a significantly elevated troponin and evidence of non-ST elevation MI.  She went for cardiac catheterization and received multiple stents to her circumflex artery.  She was then discharged home.  Since being home she is having a lot of pain and swelling in the right groin.  No further issues with chest pain or palpitations.  Her shortness of breath is at her baseline and has not changed even through this entire event.  She continues with dialysis without a problem, although sometimes her blood pressure is a little low.  She was started on Plavix in addition to her aspirin and Eliquis and other than the issues with her right groin has had no other bleeding.  Patient denies orthopnea, presyncope, syncope.

## 2021-01-11 ENCOUNTER — APPOINTMENT (OUTPATIENT)
Dept: CARDIOLOGY | Facility: CLINIC | Age: 76
End: 2021-01-11

## 2021-02-15 ENCOUNTER — APPOINTMENT (OUTPATIENT)
Dept: CARDIOLOGY | Facility: CLINIC | Age: 76
End: 2021-02-15

## 2021-02-26 ENCOUNTER — APPOINTMENT (OUTPATIENT)
Dept: CARDIOLOGY | Facility: CLINIC | Age: 76
End: 2021-02-26
Payer: MEDICARE

## 2021-02-26 PROCEDURE — 93925 LOWER EXTREMITY STUDY: CPT

## 2021-03-01 ENCOUNTER — APPOINTMENT (OUTPATIENT)
Dept: CARDIOLOGY | Facility: CLINIC | Age: 76
End: 2021-03-01
Payer: MEDICARE

## 2021-03-01 ENCOUNTER — NON-APPOINTMENT (OUTPATIENT)
Age: 76
End: 2021-03-01

## 2021-03-01 VITALS
HEIGHT: 68 IN | WEIGHT: 131 LBS | BODY MASS INDEX: 19.85 KG/M2 | DIASTOLIC BLOOD PRESSURE: 70 MMHG | HEART RATE: 81 BPM | RESPIRATION RATE: 14 BRPM | TEMPERATURE: 97.2 F | SYSTOLIC BLOOD PRESSURE: 122 MMHG

## 2021-03-01 VITALS
HEART RATE: 81 BPM | DIASTOLIC BLOOD PRESSURE: 70 MMHG | HEIGHT: 68 IN | TEMPERATURE: 97.2 F | SYSTOLIC BLOOD PRESSURE: 122 MMHG | BODY MASS INDEX: 19.85 KG/M2 | RESPIRATION RATE: 14 BRPM | WEIGHT: 131 LBS

## 2021-03-01 DIAGNOSIS — I25.10 ATHEROSCLEROTIC HEART DISEASE OF NATIVE CORONARY ARTERY W/OUT ANGINA PECTORIS: ICD-10-CM

## 2021-03-01 DIAGNOSIS — N18.6 END STAGE RENAL DISEASE: ICD-10-CM

## 2021-03-01 DIAGNOSIS — I10 ESSENTIAL (PRIMARY) HYPERTENSION: ICD-10-CM

## 2021-03-01 DIAGNOSIS — R19.09 OTHER INTRA-ABDOMINAL AND PELVIC SWELLING, MASS AND LUMP: ICD-10-CM

## 2021-03-01 DIAGNOSIS — Z95.5 PRESENCE OF CORONARY ANGIOPLASTY IMPLANT AND GRAFT: ICD-10-CM

## 2021-03-01 DIAGNOSIS — I48.0 PAROXYSMAL ATRIAL FIBRILLATION: ICD-10-CM

## 2021-03-01 DIAGNOSIS — J44.9 CHRONIC OBSTRUCTIVE PULMONARY DISEASE, UNSPECIFIED: ICD-10-CM

## 2021-03-01 DIAGNOSIS — E11.9 TYPE 2 DIABETES MELLITUS W/OUT COMPLICATIONS: ICD-10-CM

## 2021-03-01 DIAGNOSIS — R06.00 DYSPNEA, UNSPECIFIED: ICD-10-CM

## 2021-03-01 DIAGNOSIS — Z99.2 DEPENDENCE ON RENAL DIALYSIS: ICD-10-CM

## 2021-03-01 DIAGNOSIS — I73.9 PERIPHERAL VASCULAR DISEASE, UNSPECIFIED: ICD-10-CM

## 2021-03-01 PROCEDURE — 93000 ELECTROCARDIOGRAM COMPLETE: CPT

## 2021-03-01 PROCEDURE — 99215 OFFICE O/P EST HI 40 MIN: CPT

## 2021-03-01 RX ORDER — OMEPRAZOLE 20 MG/1
20 CAPSULE, DELAYED RELEASE ORAL DAILY
Refills: 0 | Status: DISCONTINUED | COMMUNITY
End: 2021-03-01

## 2021-03-01 RX ORDER — ASPIRIN ENTERIC COATED TABLETS 81 MG 81 MG/1
81 TABLET, DELAYED RELEASE ORAL DAILY
Refills: 0 | Status: DISCONTINUED | COMMUNITY
End: 2021-03-01

## 2021-03-01 RX ORDER — METOPROLOL SUCCINATE 100 MG/1
100 TABLET, EXTENDED RELEASE ORAL
Refills: 0 | Status: DISCONTINUED | COMMUNITY
End: 2021-03-01

## 2021-03-01 NOTE — PHYSICAL EXAM
[General Appearance - Well Developed] : well developed [Normal Appearance] : normal appearance [General Appearance - Well Nourished] : well nourished [General Appearance - In No Acute Distress] : no acute distress [Normal Conjunctiva] : the conjunctiva exhibited no abnormalities [Normal Oral Mucosa] : normal oral mucosa [Normal Oropharynx] : normal oropharynx [Normal Jugular Venous A Waves Present] : normal jugular venous A waves present [Normal Jugular Venous V Waves Present] : normal jugular venous V waves present [No Jugular Venous Schaefer A Waves] : no jugular venous schaefer A waves [] : no respiratory distress [Respiration, Rhythm And Depth] : normal respiratory rhythm and effort [Auscultation Breath Sounds / Voice Sounds] : lungs were clear to auscultation bilaterally [Heart Rate And Rhythm] : heart rate and rhythm were normal [Heart Sounds] : normal S1 and S2 [Edema] : no peripheral edema present [Bowel Sounds] : normal bowel sounds [Abnormal Walk] : normal gait [Nail Clubbing] : no clubbing of the fingernails [Cyanosis, Localized] : no localized cyanosis [Skin Color & Pigmentation] : normal skin color and pigmentation [Oriented To Time, Place, And Person] : oriented to person, place, and time [FreeTextEntry1] : Grade II/VI to III/VI systolic murmur

## 2021-03-01 NOTE — ASSESSMENT
[FreeTextEntry1] : EKG 3/1/2021:  The EKG illustrates sinus rhythm, rate of 81 bpm, LVH by voltage criteria, poor R wave progression V1 to V4, nonspecific ST depression and T wave abnormality.  Essentially unchanged.

## 2021-03-01 NOTE — HISTORY OF PRESENT ILLNESS
[FreeTextEntry1] : She had follow up pericardiocentesis on January 6th 2021 which drained 840 mL of bloody pericardial fluid;\par \par Patient also completed a lower extremity arterial doppler on (2/26/2021) which revealed a thrombosed pseudoaneurysm of the right groin, measures 1.8 x 1.3cm.  There was moderate to severe atherosclerosis throughout the lower extremities and 50 to 99% stenosis of the left mid SFA;\par \par She has not yet followed up with Vascular Surgery as previously advised;\par \par Tolerating current medical regimen without difficulty;\par \par

## 2021-03-01 NOTE — REASON FOR VISIT
[FreeTextEntry1] : AMY RAMIREZ is being seen for a clinic follow-up of. \par \par The patient is a 75-year-old white female with a complicated medical history involving known coronary artery disease and multiple prior PCI/stents ( most recent was bare metal stent to RCA-- March 2017);\par \par Patient also has a history of lung cancer treated with radiation therapy at [facility], pneumonia in 2019, end-stage renal disease who has been on hemodialysis almost 5 years, and unfortunately recently, in the spring developed multi-cerebral infarcts/strokes with resulting right hemiparesthesias and requiring prolonged physical therapy which she was released last month from [facility];\par \par Most recent history of being in the hospital last December 2020 where she presented with chest pain and palpitations from dialysis. She was noted to have a significantly elevated troponin and evidence of non-ST elevation MI. She went for cardiac catheterization and received multiple stents to her circumflex artery. She was then discharged home;\par \par She continues to have complaints of exertional dyspnea at times but is mostly unchanged from her baseline.  Patient denies CP, PND, orthopnea, palpitations, presyncope, syncope, edema.

## 2021-03-01 NOTE — DISCUSSION/SUMMARY
[FreeTextEntry1] : 1).  Patient advised to follow up with Vascular Surgeon (Dr. Anastasia Casillas) for consultation and evaluation of thrombosed pseudoaneurysm and LE atherosclerosis.\par \par 2).  Continue cardiac meds as directed.\par \par 3).  No additional cardiac testing indicated at this time. Will discuss Nuclear Stress test in the near future to evaluate patency of stenting and for any other signs of coronary ischemia.\par \par 4).  Follow up with PCP (Dr. Lopez) regarding routine checkups and blood work including fasting lipid panel, have copy faxed to our office. \par \par 5).  Recommend patient report any untoward symptoms. \par \par 6).  Follow up with our office in 3 to 4 months or PRN.

## 2021-06-14 ENCOUNTER — APPOINTMENT (OUTPATIENT)
Dept: CARDIOLOGY | Facility: CLINIC | Age: 76
End: 2021-06-14

## 2022-04-06 ENCOUNTER — NON-APPOINTMENT (OUTPATIENT)
Age: 77
End: 2022-04-06

## 2023-03-22 NOTE — ASU PATIENT PROFILE, ADULT - PAIN SCALE PREFERRED, PROFILE
Body Location Override (Optional - Billing Will Still Be Based On Selected Body Map Location If Applicable): nasal supratip Detail Level: Detailed Add 20590 Cpt? (Important Note: In 2017 The Use Of 39509 Is Being Tracked By Cms To Determine Future Global Period Reimbursement For Global Periods): yes Wound Evaluated By: Dr. Amanda numerical 0-10
